# Patient Record
Sex: MALE | Employment: OTHER | URBAN - METROPOLITAN AREA
[De-identification: names, ages, dates, MRNs, and addresses within clinical notes are randomized per-mention and may not be internally consistent; named-entity substitution may affect disease eponyms.]

---

## 2018-04-18 ENCOUNTER — OFFICE VISIT (OUTPATIENT)
Dept: INTERNAL MEDICINE CLINIC | Age: 33
End: 2018-04-18

## 2018-04-18 VITALS
HEIGHT: 68 IN | HEART RATE: 80 BPM | TEMPERATURE: 98 F | BODY MASS INDEX: 26.67 KG/M2 | DIASTOLIC BLOOD PRESSURE: 85 MMHG | RESPIRATION RATE: 18 BRPM | WEIGHT: 176 LBS | SYSTOLIC BLOOD PRESSURE: 131 MMHG | OXYGEN SATURATION: 96 %

## 2018-04-18 DIAGNOSIS — Z76.89 ENCOUNTER TO ESTABLISH CARE: ICD-10-CM

## 2018-04-18 DIAGNOSIS — H54.7 VISION PROBLEM: ICD-10-CM

## 2018-04-18 DIAGNOSIS — Z13.220 SCREENING FOR LIPID DISORDERS: ICD-10-CM

## 2018-04-18 DIAGNOSIS — R53.83 FATIGUE, UNSPECIFIED TYPE: Primary | ICD-10-CM

## 2018-04-18 NOTE — PATIENT INSTRUCTIONS
1.  Vision screening for distance vision here was normal today. Please see ophthalmology to evaluate symptoms further, as reviewed. Please follow the following instructions to process/authorize your referral, if needed:    Referrals processing  Please verify with your insurance IF you need referral authorization submitted. For insurance plans which require this, please follow the following steps. FAILURE TO DO SO MAY RESULT IN INABILITY TO SEE THE SPECIALIST YOU HAVE BEEN REFERRED TO (once you are scheduled to see them). 1. Call and schedule appointment with specialist  2. Call our clinic and leave message with provider name, and date of appointment  3. We will then submit the referral to your insurance. This process takes 2-5 business days. If you have questions about scheduling or authorizing referral, you can review with our referral coordinators Mercedez Parker or Kamila Corona) at the . You can review with them today if available/if you have time, or you can call to review with them once you have made your referral/appointment. If you are not sure if you need referral authorizations, please review with the referral coordinators, either prior to or after you have made the appointment, as reviewed. 2.  Please return here fasting for labs as reviewed.

## 2018-04-18 NOTE — PROGRESS NOTES
Rm 18    Chief Complaint   Patient presents with   [facility].       1. Have you been to the ER, urgent care clinic since your last visit? Hospitalized since your last visit? No    2. Have you seen or consulted any other health care providers outside of the [address] [place] [address] since your last visit? Include any pap smears or colon screening.  Yes Where: [facility], Feb 21, 2018, vaccines Reason for visit: vaccines, labs, health dept eval    Health Maintenance Due   Topic Date Due    DTaP/Tdap/Td series (1 - Tdap) 07/27/2008    Influenza Age 5 to Adult  08/01/2017     PHQ over the last two weeks 4/18/2018   Little interest or pleasure in doing things Not at all   Feeling down, depressed or hopeless Not at all   Total Score PHQ 2 0     Learning Assessment 4/18/2018   PRIMARY LEARNER Patient   HIGHEST LEVEL OF EDUCATION - PRIMARY LEARNER  4 YEARS OF COLLEGE   BARRIERS PRIMARY LEARNER NONE   CO-LEARNER CAREGIVER No   PRIMARY LANGUAGE OTHER (COMMENT)   LEARNER PREFERENCE PRIMARY READING     DEMONSTRATION     LISTENING   ANSWERED BY patient   RELATIONSHIP SELF

## 2018-04-18 NOTE — MR AVS SNAPSHOT
[address]  [address] [facility] [address] 
[phone] Patient: Carmen Egan MRN: [id number] :1985 Visit Information Date & Time Provider Department Dept. Phone Encounter #  
 2018  9:30 AM Maria D Blakely, [address] [place] [address] and Internal Medicine [phone] [id number] Follow-up Instructions Return in about 3 weeks (around 2018) for lab review, referral follow-up; fasting labs here in 1-2 weeks. Upcoming Health Maintenance Date Due DTaP/Tdap/Td series (1 - Tdap) 2008 Influenza Age 5 to Adult 2017 Allergies as of 2018  Review Complete On: 2018 By: Maria D Blakely MD  
 Not on File Current Immunizations  Never Reviewed No immunizations on file. Not reviewed this visit You Were Diagnosed With   
  
 Codes Comments Fatigue, unspecified type    -  Primary ICD-10-CM: R53.83 ICD-9-CM: 780.79 Vision problem     ICD-10-CM: H54.7 ICD-9-CM: V41.0 Screening for lipid disorders     ICD-10-CM: Z13.220 ICD-9-CM: V77.91 Encounter to establish care     ICD-10-CM: Z76.89 
ICD-9-CM: V65.8 Vitals BP Pulse Temp Resp Height(growth percentile) Weight(growth percentile) 131/85 (BP 1 Location: Left arm, BP Patient Position: Sitting) 80 98 °F (36.7 °C) (Oral) 18 5' 8\" (1.727 m) 176 lb (79.8 kg) SpO2 BMI Smoking Status 96% 26.76 kg/m2 Never Smoker BMI and BSA Data Body Mass Index Body Surface Area  
 26.76 kg/m 2 1.96 m 2 Preferred Pharmacy Pharmacy Name Phone [address] [facility], [address] [place]  [phone] Your Updated Medication List  
  
Notice  As of 2018 11:10 AM  
 You have not been prescribed any medications. We Performed the Following AMB POC VISUAL ACUITY SCREEN [55188 CPT(R)] REFERRAL TO PEDIATRIC OPHTHALMOLOGY [UFU585 Custom] Comments:  
 Please evaluate patient for eye fatigue, \"heaviness\". Right 20/25; left 20/20; both 20/20 with distance screening in clinic. Follow-up Instructions Return in about 3 weeks (around 5/9/2018) for lab review, referral follow-up; fasting labs here in 1-2 weeks. To-Do List   
 04/19/2018 Lab:  HEMOGLOBIN A1C WITH EAG   
  
 04/19/2018 Lab:  HEPATIC FUNCTION PANEL   
  
 04/19/2018 Lab:  LIPID PANEL   
  
 04/19/2018 Lab:  T4, FREE   
  
 04/19/2018 Lab:  TSH 3RD GENERATION   
  
 04/19/2018 Lab:  VITAMIN B12 & FOLATE   
  
 04/19/2018 Lab:  VITAMIN D, 25 HYDROXY Referral Information Referral ID Referred By Referred To  
  
 [id number] Dayana Sandoval MD   
   [address] [facility], [address] [place] [address] Phone: [phone] Fax: [phone] Visits Status Start Date End Date 1 New Request 4/18/18 4/18/19 If your referral has a status of pending review or denied, additional information will be sent to support the outcome of this decision. Patient Instructions 1. Vision screening for distance vision here was normal today. Please see ophthalmology to evaluate symptoms further, as reviewed. Please follow the following instructions to process/authorize your referral, if needed: 
 
Referrals processing Please verify with your insurance IF you need referral authorization submitted. For insurance plans which require this, please follow the following steps. FAILURE TO DO SO MAY RESULT IN INABILITY TO SEE THE SPECIALIST YOU HAVE BEEN REFERRED TO (once you are scheduled to see them). 1. Call and schedule appointment with specialist 
2. Call our clinic and leave message with provider name, and date of appointment 3. We will then submit the referral to your insurance. This process takes 2-5 business days. If you have questions about scheduling or authorizing referral, you can review with our referral coordinators Chacha rBavo or Arti Acuna) at the . You can review with them today if available/if you have time, or you can call to review with them once you have made your referral/appointment. If you are not sure if you need referral authorizations, please review with the referral coordinators, either prior to or after you have made the appointment, as reviewed. 2.  Please return here fasting for labs as reviewed. Introducing hospitals & HEALTH SERVICES! Rain Sarmiento introduces [employer] patient portal. Now you can access parts of your medical record, email your doctor's office, and request medication refills online. 1. In your internet browser, go to [email or web address] 2. Click on the First Time User? Click Here link in the Sign In box. You will see the New Member Sign Up page. 3. Enter your [employer] Access Code exactly as it appears below. You will not need to use this code after youve completed the sign-up process. If you do not sign up before the expiration date, you must request a new code. · [employer] Access Code: [email or web address] Expires: 7/17/2018 11:09 AM 
 
4. Enter the last four digits of your Social Security Number (xxxx) and Date of Birth (mm/dd/yyyy) as indicated and click Submit. You will be taken to the next sign-up page. 5. Create a [employer] ID. This will be your [employer] login ID and cannot be changed, so think of one that is secure and easy to remember. 6. Create a [employer] password. You can change your password at any time. 7. Enter your Password Reset Question and Answer. This can be used at a later time if you forget your password. 8. Enter your e-mail address. You will receive e-mail notification when new information is available in [address] 9. Click Sign Up. You can now view and download portions of your medical record. 10. Click the Download Summary menu link to download a portable copy of your medical information. If you have questions, please visit the Frequently Asked Questions section of the [employer] website. Remember, [employer] is NOT to be used for urgent needs. For medical emergencies, dial 911. Now available from your iPhone and Android! Please provide this summary of care documentation to your next provider. Your primary care clinician is listed as [address] [facility]. If you have any questions after today's visit, please call [phone].

## 2018-04-18 NOTE — PROGRESS NOTES
History of Present Illness:   Cathie Hurst is a 29 y.o. male here for evaluation:    Chief Complaint   Patient presents with   [address] records reviewed--entered in [facility] as below:  Past Medical History:   Diagnosis Date    Immunity status testing 02/20/2018    Health Dept Labs:  Varicella non-immune. VZV #1 rec'd at health dept 2022/18.  Routine screening for STI (sexually transmitted infection) 02/20/2018    Health Dept Labs:  HIV non-reactive. T pallidum (syphilis) Ab negative. Hep B negative (negative total cAb, sAb, sAg).  Screening for tuberculosis 02/20/2018    Health Dept Labs:  Negative.  Screening, iron deficiency anemia 02/20/2018    Health Dept Labs:  Normal H18. Normal Chem 8 with glc 100. [facility]:  --Nov 11, 2017:  Influenza, IPV, MMR, Tdap. --Feb 22, 2018: MMR #2, Td, VZV. Abstracted after visit. Has immunization visit scheduled with health dept for Aug 22, 2018 for follow-up vacccine(s). Reviewed [facility]. Pt notes:  He notes in afternoon and evening, starting at 4PM, he feels tired. Notes when not doing work. He notes eyes feel heavy and tired. No vision problems or eye strain noted. He notes eye burning and redness. No vision problems with near vision or distance noted. He notes prior to moving here, using computer 8hrs/day. He was in [place] prior. Emigrated Feb 7, 2018. Onset 2yrs ago in [place]--no eval there/not available. He had DMV testing for vision and normal.    He has not seen ophth since here, nor has his family. No allergy problems noted. Symptoms pre-dated worsening pollen counts here, and noted same problems in [place]. Sometimes has general weakness and fatigue. Screenings as above. He has no chronic medical diagnoses noted.         Prior to Admission medications    Not on File        ROS  Complete ROS negative except as indicated in note. Vitals:    04/18/18 1000   BP: 131/85   Pulse: 80   Resp: 18   Temp: 98 °F (36.7 °C)   TempSrc: Oral   SpO2: 96%   Weight: 176 lb (79.8 kg)   Height: 5' 8\" (1.727 m)   PainSc:   0 - No pain      Body mass index is 26.76 kg/(m^2). Physical Exam:     Physical Exam   Constitutional: He appears well-developed and well-nourished. No distress. HENT:   Head: Normocephalic and atraumatic. Right Ear: External ear normal.   Left Ear: External ear normal.   Mouth/Throat: Oropharynx is clear and moist. No oropharyngeal exudate. TM's with no fluid bilat. No injection or erythema noted bilat. Eyes: Conjunctivae are normal. Right eye exhibits no discharge. Left eye exhibits no discharge. No scleral icterus. Undilated fundoscopic exam normal bilaterally. No significant palpebral or bulbar conjunctival erythema bilat. Neck: Normal range of motion. Neck supple. No tracheal deviation present. No thyromegaly present. Cardiovascular: Normal rate, regular rhythm, normal heart sounds and intact distal pulses. Exam reveals no gallop and no friction rub. No murmur heard. Pulmonary/Chest: Effort normal and breath sounds normal. No stridor. No respiratory distress. He has no wheezes. He has no rales. Abdominal: Soft. Bowel sounds are normal. He exhibits no distension. There is no tenderness. There is no rebound and no guarding. Musculoskeletal: He exhibits no edema, tenderness or deformity. Lymphadenopathy:     He has no cervical adenopathy. Neurological: He is alert. He exhibits normal muscle tone. Coordination normal.   Skin: Skin is warm. No rash noted. He is not diaphoretic. No erythema. No pallor. Psychiatric: He has a normal mood and affect. His behavior is normal. Judgment and thought content normal.       Assessment and Plan:       ICD-10-CM ICD-9-CM    1.  Fatigue, unspecified type R53.83 780.79 TSH 3RD GENERATION      T4, FREE      HEMOGLOBIN A1C WITH EAG VITAMIN B12 & FOLATE      VITAMIN D, 25 HYDROXY   2. Vision problem H54.7 V41.0 AMB POC VISUAL ACUITY SCREEN      REFERRAL TO PEDIATRIC OPHTHALMOLOGY   3. Screening for lipid disorders Z13.220 V77.91 HEPATIC FUNCTION PANEL      LIPID PANEL   4. Encounter to establish care Z76.89 V65.8        1. Lab eval here reviewed. Plan fasting later this week since not fasting at visit. He plans to return this week--maybe tomorrow. Lab scheduling reviewed. 2.  Eval with ophth reviewed. Normal exam here today. Poor discrimination of green color--to eval with ophth. Reviewed with pt at visit. 3.  Fasting labs with fatigue eval labs reviewed. Follow-up Disposition:  Return in about 3 weeks (around 5/9/2018) for lab review, referral follow-up; fasting labs here in 1-2 weeks. lab results and schedule of future lab studies reviewed with patient    For additional documentation of information and/or recommendations discussed this visit, please see notes in instructions. Plan and evaluation (above) reviewed with pt at visit  Patient voiced understanding of plan and provided with time to ask/review questions. After Visit Summary (AVS) provided to pt after visit with additional instructions as needed/reviewed.

## 2018-05-09 ENCOUNTER — OFFICE VISIT (OUTPATIENT)
Dept: INTERNAL MEDICINE CLINIC | Age: 33
End: 2018-05-09

## 2018-05-09 VITALS
HEART RATE: 60 BPM | DIASTOLIC BLOOD PRESSURE: 73 MMHG | HEIGHT: 68 IN | SYSTOLIC BLOOD PRESSURE: 112 MMHG | RESPIRATION RATE: 16 BRPM | OXYGEN SATURATION: 97 % | TEMPERATURE: 97.3 F | BODY MASS INDEX: 26.58 KG/M2 | WEIGHT: 175.38 LBS

## 2018-05-09 DIAGNOSIS — E78.00 ELEVATED LDL CHOLESTEROL LEVEL: ICD-10-CM

## 2018-05-09 DIAGNOSIS — E55.9 HYPOVITAMINOSIS D: Primary | ICD-10-CM

## 2018-05-09 RX ORDER — ERGOCALCIFEROL 1.25 MG/1
50000 CAPSULE ORAL
Qty: 12 CAP | Refills: 0 | Status: SHIPPED | OUTPATIENT
Start: 2018-05-09 | End: 2018-07-26

## 2018-05-09 NOTE — PATIENT INSTRUCTIONS
1.  Once the weekly vitamin D supplement (ergocalciferol) is completed, take at least 2,000 units vitamin D once daily as reviewed. To repeat vitamin D level do not need to fast for labs. 2.  Work on diet and exercise and can repeat cholesterol values in 3-6mo. To repeat cholesterol, need to fast for labs. High Cholesterol: Care Instructions  Your Care Instructions    Cholesterol is a type of fat in your blood. It is needed for many body functions, such as making new cells. Cholesterol is made by your body. It also comes from food you eat. High cholesterol means that you have too much of the fat in your blood. This raises your risk of a heart attack and stroke. LDL and HDL are part of your total cholesterol. LDL is the \"bad\" cholesterol. High LDL can raise your risk for heart disease, heart attack, and stroke. HDL is the \"good\" cholesterol. It helps clear bad cholesterol from the body. High HDL is linked with a lower risk of heart disease, heart attack, and stroke. Your cholesterol levels help your doctor find out your risk for having a heart attack or stroke. You and your doctor can talk about whether you need to lower your risk and what treatment is best for you. A heart-healthy lifestyle along with medicines can help lower your cholesterol and your risk. The way you choose to lower your risk will depend on how high your risk is for heart attack and stroke. It will also depend on how you feel about taking medicines. Follow-up care is a key part of your treatment and safety. Be sure to make and go to all appointments, and call your doctor if you are having problems. It's also a good idea to know your test results and keep a list of the medicines you take. How can you care for yourself at home? · Eat a variety of foods every day.  Good choices include fruits, vegetables, whole grains (like oatmeal), dried beans and peas, nuts and seeds, soy products (like tofu), and fat-free or low-fat dairy products. · Replace butter, margarine, and hydrogenated or partially hydrogenated oils with olive and canola oils. (Canola oil margarine without trans fat is fine.)  · Replace red meat with fish, poultry, and soy protein (like tofu). · Limit processed and packaged foods like chips, crackers, and cookies. · Bake, broil, or steam foods. Don't frank them. · Be physically active. Get at least 30 minutes of exercise on most days of the week. Walking is a good choice. You also may want to do other activities, such as running, swimming, cycling, or playing tennis or team sports. · Stay at a healthy weight or lose weight by making the changes in eating and physical activity listed above. Losing just a small amount of weight, even 5 to 10 pounds, can reduce your risk for having a heart attack or stroke. · Do not smoke. When should you call for help? Watch closely for changes in your health, and be sure to contact your doctor if:  ? · You need help making lifestyle changes. ? · You have questions about your medicine. Where can you learn more? Go to http://marshallNeocase Softwarelila.info/. Enter X635 in the search box to learn more about \"High Cholesterol: Care Instructions. \"  Current as of: September 21, 2016  Content Version: 11.4  © 8933-6756 [employer]. Care instructions adapted under license by [employer] (which disclaims liability or warranty for this information). If you have questions about a medical condition or this instruction, always ask your healthcare professional. [place] [address] any warranty or liability for your use of this information. Learning About Vitamin D  Why is it important to get enough vitamin D? Your body needs vitamin D to absorb calcium. Calcium keeps your bones and muscles, including your heart, healthy and strong. If your muscles don't get enough calcium, they can cramp, hurt, or feel weak.  You may have long-term (chronic) muscle aches and pains. If you don't get enough vitamin D throughout life, you have an increased chance of having thin and brittle bones (osteoporosis) in your later years. Children who don't get enough vitamin D may not grow as much as others their age. They also have a chance of getting a rare disease called rickets. It causes weak bones. Vitamin D and calcium are added to many foods. And your body uses sunshine to make its own vitamin D. How much vitamin D do you need? The [employer] recommends that people ages 3 through 79 get 600 IU (international units) every day. Adults 71 and older need 800 IU every day. Blood tests for vitamin D can check your vitamin D level. But there is no standard normal range used by all laboratories. The [employer] of [employer] recommends a blood level of 20 ng/mL of vitamin D for healthy bones. And most people in the [place] and [facility] meet this goal.  How can you get more vitamin D? Foods that contain vitamin D include:  · Calipatria, tuna, and mackerel. These are some of the best foods to eat when you need to get more vitamin D.  · Cheese, egg yolks, and beef liver. These foods have vitamin D in small amounts. · Milk, soy drinks, orange juice, yogurt, margarine, and some kinds of cereal have vitamin D added to them. Some people don't make vitamin D as well as others. They may have to take extra care in getting enough vitamin D. Things that reduce how much vitamin D your body makes include:  · Dark skin, such as many  Americans have. · Age, especially if you are older than 72. · Digestive problems, such as Crohn's or celiac disease. · Liver and kidney disease. Some people who do not get enough vitamin D may need supplements. Are there any risks from taking vitamin D?  · Too much vitamin D:  ¨ Can damage your kidneys. ¨ Can cause nausea and vomiting, constipation, and weakness. ¨ Raises the amount of calcium in your blood.  If this happens, you can get confused or have an irregular heart rhythm. · Vitamin D may interact with other medicines. Tell your doctor about all of the medicines you take, including over-the-counter drugs, herbs, and pills. Tell your doctor about all of your current medical problems. Where can you learn more? Go to http://callie.info/. Enter [id number] in the search box to learn more about \"Learning About Vitamin D.\"  Current as of: May 12, 2017  Content Version: 11.4  © 6603-8218 [employer]. Care instructions adapted under license by [employer] (which disclaims liability or warranty for this information). If you have questions about a medical condition or this instruction, always ask your healthcare professional. [address] any warranty or liability for your use of this information. Learning About High Cholesterol  What is high cholesterol? Cholesterol is a type of fat in your blood. It is needed for many body functions, such as making new cells. Cholesterol is made by your body. It also comes from food you eat. If you have too much cholesterol, it starts to build up in your arteries. This is called hardening of the arteries, or atherosclerosis. High cholesterol raises your risk of a heart attack and stroke. There are different types of cholesterol. LDL is the \"bad\" cholesterol. High LDL can raise your risk for heart disease, heart attack, and stroke. HDL is the \"good\" cholesterol. High HDL is linked with a lower risk for heart disease, heart attack, and stroke. Your cholesterol levels help your doctor find out your risk for having a heart attack or stroke. How can you prevent high cholesterol? A heart-healthy lifestyle can help you prevent high cholesterol. This lifestyle helps lower your risk for a heart attack and stroke. · Eat heart-healthy foods.   ¨ Eat fruits, vegetables, whole grains (like oatmeal), dried beans and peas, nuts and seeds, soy products (like tofu), and fat-free or low-fat dairy products. ¨ Replace butter, margarine, and hydrogenated or partially hydrogenated oils with olive and canola oils. (Canola oil margarine without trans fat is fine.)  ¨ Replace red meat with fish, poultry, and soy protein (like tofu). ¨ Limit processed and packaged foods like chips, crackers, and cookies. · Be active. Exercise can improve your cholesterol level. Get at least 30 minutes of exercise on most days of the week. Walking is a good choice. You also may want to do other activities, such as running, swimming, cycling, or playing tennis or team sports. · Stay at a healthy weight. Lose weight if you need to. · Don't smoke. If you need help quitting, talk to your doctor about stop-smoking programs and medicines. These can increase your chances of quitting for good. How is high cholesterol treated? The goal of treatment is to reduce your chances of having a heart attack or stroke. The goal is not to lower your cholesterol numbers only. · You may make lifestyle changes, such as eating healthy foods, not smoking, losing weight, and being more active. · You may have to take medicine. Follow-up care is a key part of your treatment and safety. Be sure to make and go to all appointments, and call your doctor if you are having problems. It's also a good idea to know your test results and keep a list of the medicines you take. Where can you learn more? Go to http://marshall-lila.info/. Enter U495 in the search box to learn more about \"Learning About High Cholesterol. \"  Current as of: September 21, 2016  Content Version: 11.4  © 1935-6279 [employer]. Care instructions adapted under license by [employer] (which disclaims liability or warranty for this information).  If you have questions about a medical condition or this instruction, always ask your healthcare professional. [address] any warranty or liability for your use of this information.

## 2018-05-09 NOTE — MR AVS SNAPSHOT
[address] [facility] [place] [address] 
[phone] Patient: Guillermina Rubinstein MRN: [id number] :1985 Visit Information Date & Time Provider Department Dept. Phone Encounter #  
 2018 11:30 AM Claudette Pires, [address] [place] [address] and Internal Medicine [phone] [id number] Follow-up Instructions Return in about 6 months (around 2018), or if symptoms worsen or fail to improve, for fasting labs--3-6mo. Upcoming Health Maintenance Date Due Influenza Age 5 to Adult 2018 DTaP/Tdap/Td series (2 - Td) 2028 Allergies as of 2018  Review Complete On: 2018 By: Claudette Pires MD  
 Not on File Current Immunizations  Never Reviewed Name Date IPV 2017 Influenza Vaccine 2017 MMR 2018, 2017 Td 2018 Tdap 2017 Varicella Virus Vaccine 2018 Not reviewed this visit You Were Diagnosed With   
  
 Codes Comments Hypovitaminosis D    -  Primary ICD-10-CM: E55.9 ICD-9-CM: 268.9 Elevated LDL cholesterol level     ICD-10-CM: E78.00 ICD-9-CM: 272.0 Vitals BP Pulse Temp Resp Height(growth percentile) Weight(growth percentile) 112/73 (BP 1 Location: Left arm, BP Patient Position: Sitting) 60 97.3 °F (36.3 °C) (Oral) 16 5' 8\" (1.727 m) 175 lb 6 oz (79.5 kg) SpO2 BMI Smoking Status 97% 26.67 kg/m2 Never Smoker BMI and BSA Data Body Mass Index Body Surface Area  
 26.67 kg/m 2 1.95 m 2 Preferred Pharmacy Pharmacy Name Phone [facility] [address] [facility], [address] [place] [address]  [phone] Your Updated Medication List  
  
   
This list is accurate as of 18 12:55 PM.  Always use your most recent med list.  
  
  
  
  
 ergocalciferol 50,000 unit capsule Commonly known as:  ERGOCALCIFEROL Take 1 Cap by mouth every seven (7) days for 12 doses. Prescriptions Sent to Pharmacy Refills  
 ergocalciferol (ERGOCALCIFEROL) 50,000 unit capsule 0 Sig: Take 1 Cap by mouth every seven (7) days for 12 doses. Class: Normal  
 Pharmacy: [address], [place]  #: [phone] Route: Oral  
  
Follow-up Instructions Return in about 6 months (around 11/9/2018), or if symptoms worsen or fail to improve, for fasting labs--3-6mo. Patient Instructions 1. Once the weekly vitamin D supplement (ergocalciferol) is completed, take at least 2,000 units vitamin D once daily as reviewed. To repeat vitamin D level do not need to fast for labs. 2.  Work on diet and exercise and can repeat cholesterol values in 3-6mo. To repeat cholesterol, need to fast for labs. High Cholesterol: Care Instructions Your Care Instructions Cholesterol is a type of fat in your blood. It is needed for many body functions, such as making new cells. Cholesterol is made by your body. It also comes from food you eat. High cholesterol means that you have too much of the fat in your blood. This raises your risk of a heart attack and stroke. LDL and HDL are part of your total cholesterol. LDL is the \"bad\" cholesterol. High LDL can raise your risk for heart disease, heart attack, and stroke. HDL is the \"good\" cholesterol. It helps clear bad cholesterol from the body. High HDL is linked with a lower risk of heart disease, heart attack, and stroke. Your cholesterol levels help your doctor find out your risk for having a heart attack or stroke. You and your doctor can talk about whether you need to lower your risk and what treatment is best for you. A heart-healthy lifestyle along with medicines can help lower your cholesterol and your risk.  The way you choose to lower your risk will depend on how high your risk is for heart attack and stroke. It will also depend on how you feel about taking medicines. Follow-up care is a key part of your treatment and safety. Be sure to make and go to all appointments, and call your doctor if you are having problems. It's also a good idea to know your test results and keep a list of the medicines you take. How can you care for yourself at home? · Eat a variety of foods every day. Good choices include fruits, vegetables, whole grains (like oatmeal), dried beans and peas, nuts and seeds, soy products (like tofu), and fat-free or low-fat dairy products. · Replace butter, margarine, and hydrogenated or partially hydrogenated oils with olive and canola oils. (Canola oil margarine without trans fat is fine.) · Replace red meat with fish, poultry, and soy protein (like tofu). · Limit processed and packaged foods like chips, crackers, and cookies. · Bake, broil, or steam foods. Don't frank them. · Be physically active. Get at least 30 minutes of exercise on most days of the week. Walking is a good choice. You also may want to do other activities, such as running, swimming, cycling, or playing tennis or team sports. · Stay at a healthy weight or lose weight by making the changes in eating and physical activity listed above. Losing just a small amount of weight, even 5 to 10 pounds, can reduce your risk for having a heart attack or stroke. · Do not smoke. When should you call for help? Watch closely for changes in your health, and be sure to contact your doctor if: 
? · You need help making lifestyle changes. ? · You have questions about your medicine. Where can you learn more? Go to http://marshall-lila.info/. Enter F299 in the search box to learn more about \"High Cholesterol: Care Instructions. \" Current as of: September 21, 2016 Content Version: 11.4 © 1796-4227 Healthwise, Incorporated.  Care instructions adapted under license by [facility] [address] (which disclaims liability or warranty for this information). If you have questions about a medical condition or this instruction, always ask your healthcare professional. [address] any warranty or liability for your use of this information. Learning About Vitamin D Why is it important to get enough vitamin D? Your body needs vitamin D to absorb calcium. Calcium keeps your bones and muscles, including your heart, healthy and strong. If your muscles don't get enough calcium, they can cramp, hurt, or feel weak. You may have long-term (chronic) muscle aches and pains. If you don't get enough vitamin D throughout life, you have an increased chance of having thin and brittle bones (osteoporosis) in your later years. Children who don't get enough vitamin D may not grow as much as others their age. They also have a chance of getting a rare disease called rickets. It causes weak bones. Vitamin D and calcium are added to many foods. And your body uses sunshine to make its own vitamin D. How much vitamin D do you need? The [place] of Medicine recommends that people ages 3 through 79 get 600 IU (international units) every day. Adults 71 and older need 800 IU every day. Blood tests for vitamin D can check your vitamin D level. But there is no standard normal range used by all laboratories. The [place] of Medicine recommends a blood level of 20 ng/mL of vitamin D for healthy bones. And most people in the [place] and [facility] ([facility]) meet this goal. 
How can you get more vitamin D? Foods that contain vitamin D include: 
· [place], tuna, and mackerel. These are some of the best foods to eat when you need to get more vitamin D. 
· Cheese, egg yolks, and beef liver. These foods have vitamin D in small amounts. · Milk, soy drinks, orange juice, yogurt, margarine, and some kinds of cereal have vitamin D added to them. Some people don't make vitamin D as well as others. They may have to take extra care in getting enough vitamin D. Things that reduce how much vitamin D your body makes include: · Dark skin, such as many  Americans have. · Age, especially if you are older than 72. · Digestive problems, such as Crohn's or celiac disease. · Liver and kidney disease. Some people who do not get enough vitamin D may need supplements. Are there any risks from taking vitamin D? 
· Too much vitamin D: 
¨ Can damage your kidneys. ¨ Can cause nausea and vomiting, constipation, and weakness. ¨ Raises the amount of calcium in your blood. If this happens, you can get confused or have an irregular heart rhythm. · Vitamin D may interact with other medicines. Tell your doctor about all of the medicines you take, including over-the-counter drugs, herbs, and pills. Tell your doctor about all of your current medical problems. Where can you learn more? Go to http://marshall-lila.info/. Enter [id number] in the search box to learn more about \"Learning About Vitamin D.\" 
Current as of: May 12, 2017 Content Version: 11.4 © 1801-0245 [employer]. Care instructions adapted under license by [employer] (which disclaims liability or warranty for this information). If you have questions about a medical condition or this instruction, always ask your healthcare professional. [address] any warranty or liability for your use of this information. Learning About High Cholesterol What is high cholesterol? Cholesterol is a type of fat in your blood. It is needed for many body functions, such as making new cells. Cholesterol is made by your body. It also comes from food you eat. If you have too much cholesterol, it starts to build up in your arteries. This is called hardening of the arteries, or atherosclerosis. High cholesterol raises your risk of a heart attack and stroke. There are different types of cholesterol. LDL is the \"bad\" cholesterol. High LDL can raise your risk for heart disease, heart attack, and stroke. HDL is the \"good\" cholesterol. High HDL is linked with a lower risk for heart disease, heart attack, and stroke. Your cholesterol levels help your doctor find out your risk for having a heart attack or stroke. How can you prevent high cholesterol? A heart-healthy lifestyle can help you prevent high cholesterol. This lifestyle helps lower your risk for a heart attack and stroke. · Eat heart-healthy foods. ¨ Eat fruits, vegetables, whole grains (like oatmeal), dried beans and peas, nuts and seeds, soy products (like tofu), and fat-free or low-fat dairy products. ¨ Replace butter, margarine, and hydrogenated or partially hydrogenated oils with olive and canola oils. (Canola oil margarine without trans fat is fine.) ¨ Replace red meat with fish, poultry, and soy protein (like tofu). ¨ Limit processed and packaged foods like chips, crackers, and cookies. · Be active. Exercise can improve your cholesterol level. Get at least 30 minutes of exercise on most days of the week. Walking is a good choice. You also may want to do other activities, such as running, swimming, cycling, or playing tennis or team sports. · Stay at a healthy weight. Lose weight if you need to. · Don't smoke. If you need help quitting, talk to your doctor about stop-smoking programs and medicines. These can increase your chances of quitting for good. How is high cholesterol treated? The goal of treatment is to reduce your chances of having a heart attack or stroke. The goal is not to lower your cholesterol numbers only. · You may make lifestyle changes, such as eating healthy foods, not smoking, losing weight, and being more active. · You may have to take medicine. Follow-up care is a key part of your treatment and safety.  Be sure to make and go to all appointments, and call your doctor if you are having problems. It's also a good idea to know your test results and keep a list of the medicines you take. Where can you learn more? Go to http://marshall-lila.info/. Enter K428 in the search box to learn more about \"Learning About High Cholesterol. \" Current as of: September 21, 2016 Content Version: 11.4 © 4493-2363 [employer]. Care instructions adapted under license by [employer] (which disclaims liability or warranty for this information). If you have questions about a medical condition or this instruction, always ask your healthcare professional. [address] any warranty or liability for your use of this information. Introducing [facility]! [facility] introduces Relaborate patient portal. Now you can access parts of your medical record, email your doctor's office, and request medication refills online. 1. In your internet browser, go to [email or web address] 2. Click on the First Time User? Click Here link in the Sign In box. You will see the New Member Sign Up page. 3. Enter your Relaborate Access Code exactly as it appears below. You will not need to use this code after youve completed the sign-up process. If you do not sign up before the expiration date, you must request a new code. · Relaborate Access Code: [email or web address] Expires: 7/17/2018 11:09 AM 
 
4. Enter the last four digits of your Social Security Number (xxxx) and Date of Birth (mm/dd/yyyy) as indicated and click Submit. You will be taken to the next sign-up page. 5. Create a Electronic Compute Systemst ID. This will be your Relaborate login ID and cannot be changed, so think of one that is secure and easy to remember. 6. Create a Relaborate password. You can change your password at any time. 7. Enter your Password Reset Question and Answer.  This can be used at a later time if you forget your password. 8. Enter your e-mail address. You will receive e-mail notification when new information is available in [address] 9. Click Sign Up. You can now view and download portions of your medical record. 10. Click the Download Summary menu link to download a portable copy of your medical information. If you have questions, please visit the Frequently Asked Questions section of the [employer] website. Remember, [employer] is NOT to be used for urgent needs. For medical emergencies, dial 911. Now available from your iPhone and Android! Please provide this summary of care documentation to your next provider. Your primary care clinician is listed as [address] [facility]. If you have any questions after today's visit, please call [phone].

## 2018-05-09 NOTE — PROGRESS NOTES
History of Present Illness:   Guillermina Rubinstein is a 29 y.o. male here for evaluation:    Chief Complaint   Patient presents with    Labs     review    Referral Follow Up     Here for lab follow-up and ophth follow-up. Referred to ophth for possible eye problem/eye strain. He notes seeing ophth tomorrow. Prior to Admission medications    Not on File        ROS    Vitals:    05/09/18 1200   BP: 112/73   Pulse: 60   Resp: 16   Temp: 97.3 °F (36.3 °C)   TempSrc: Oral   SpO2: 97%   Weight: 175 lb 6 oz (79.5 kg)   Height: 5' 8\" (1.727 m)   PainSc:   0 - No pain      Body mass index is 26.67 kg/(m^2). Physical Exam:     Physical Exam   Constitutional: He appears well-developed and well-nourished. No distress. HENT:   Head: Normocephalic and atraumatic. Eyes: Conjunctivae are normal. Right eye exhibits no discharge. Left eye exhibits no discharge. No scleral icterus. Cardiovascular: Normal rate. Pulmonary/Chest: Effort normal. No stridor. Abdominal: He exhibits no distension. Neurological: He is alert. He exhibits normal muscle tone. Coordination normal.   Skin: Skin is warm. No rash noted. He is not diaphoretic. No pallor. Psychiatric: He has a normal mood and affect. His behavior is normal. Judgment and thought content normal.       Assessment and Plan:       ICD-10-CM ICD-9-CM    1. Hypovitaminosis D E55.9 268.9 ergocalciferol (ERGOCALCIFEROL) 50,000 unit capsule   2. Elevated LDL cholesterol level E78.00 272.0        Reviewed labs as above. Follow-up Disposition:  Return in about 6 months (around 11/9/2018), or if symptoms worsen or fail to improve, for fasting labs--3-6mo. lab results and schedule of future lab studies reviewed with patient  reviewed diet, exercise and weight control  reviewed medications and side effects in detail    For additional documentation of information and/or recommendations discussed this visit, please see notes in instructions.       Plan and evaluation (above) reviewed with pt at visit  Patient voiced understanding of plan and provided with time to ask/review questions. After Visit Summary (AVS) provided to pt after visit with additional instructions as needed/reviewed.

## 2018-05-09 NOTE — PROGRESS NOTES
RM 18    Chief Complaint   Patient presents with    Labs     review    Referral Follow Up       1. Have you been to the ER, urgent care clinic since your last visit? Hospitalized since your last visit? No    2. Have you seen or consulted any other health care providers outside of the [address] [place] [address] since your last visit? Include any pap smears or colon screening. No    There are no preventive care reminders to display for this patient.     PHQ over the last two weeks 5/9/2018   Little interest or pleasure in doing things Not at all   Feeling down, depressed or hopeless Not at all   Total Score PHQ 2 0

## 2018-11-02 ENCOUNTER — CLINICAL SUPPORT (OUTPATIENT)
Dept: INTERNAL MEDICINE CLINIC | Age: 33
End: 2018-11-02

## 2018-11-02 DIAGNOSIS — Z23 ENCOUNTER FOR IMMUNIZATION: Primary | ICD-10-CM

## 2018-11-02 NOTE — PROGRESS NOTES
Chief Complaint   Patient presents with    Immunization/Injection         Vishal Lang who presents for routine immunizations. Pt received in left deltoid. Maureenl Dies denies any symptoms, reactions or allergies that would exclude them from being immunized today. Risks and adverse reactions were discussed and the VIS was given to them. All questions were addressed. Vishal Dies was observed for 10 min post injection. There were no reactions observed. Verbal Order received per Dr. Joel Alcaraz to administer influenza vaccine.

## 2018-12-05 ENCOUNTER — OFFICE VISIT (OUTPATIENT)
Dept: INTERNAL MEDICINE CLINIC | Age: 33
End: 2018-12-05

## 2018-12-05 VITALS
HEART RATE: 75 BPM | RESPIRATION RATE: 16 BRPM | HEIGHT: 68 IN | SYSTOLIC BLOOD PRESSURE: 121 MMHG | DIASTOLIC BLOOD PRESSURE: 80 MMHG | OXYGEN SATURATION: 96 % | TEMPERATURE: 98.5 F | BODY MASS INDEX: 27.49 KG/M2 | WEIGHT: 181.38 LBS

## 2018-12-05 DIAGNOSIS — R10.30 LOWER ABDOMINAL PAIN: Primary | ICD-10-CM

## 2018-12-05 DIAGNOSIS — R31.29 OTHER MICROSCOPIC HEMATURIA: ICD-10-CM

## 2018-12-05 DIAGNOSIS — N50.812 PAIN IN LEFT TESTICLE: ICD-10-CM

## 2018-12-05 LAB
BILIRUB UR QL STRIP: NEGATIVE
GLUCOSE UR-MCNC: NEGATIVE MG/DL
KETONES P FAST UR STRIP-MCNC: NEGATIVE MG/DL
PH UR STRIP: 5.5 [PH] (ref 4.6–8)
PROT UR QL STRIP: NEGATIVE
SP GR UR STRIP: 1.02 (ref 1–1.03)
UA UROBILINOGEN AMB POC: NORMAL (ref 0.2–1)
URINALYSIS CLARITY POC: CLEAR
URINALYSIS COLOR POC: YELLOW
URINE BLOOD POC: NORMAL
URINE LEUKOCYTES POC: NEGATIVE
URINE NITRITES POC: NEGATIVE

## 2018-12-05 RX ORDER — POLYETHYLENE GLYCOL 3350 17 G/17G
17 POWDER, FOR SOLUTION ORAL DAILY
Qty: 595 G | Refills: 1 | Status: SHIPPED | OUTPATIENT
Start: 2018-12-05 | End: 2019-09-19

## 2018-12-05 NOTE — PROGRESS NOTES
RM 17 Patient has concerns with weight gain, reports exercising more, diet same from previous visit. Chief Complaint Patient presents with  Abdominal Pain  
  occuring for the past month. C/o pain to lower abdomen prior to having to have a bowel movement. 1. Have you been to the ER, urgent care clinic since your last visit? Hospitalized since your last visit? No 
 
2. Have you seen or consulted any other health care providers outside of the [address] [place] [address] since your last visit? Include any pap smears or colon screening. No 
 
There are no preventive care reminders to display for this patient. PHQ over the last two weeks 12/5/2018 Little interest or pleasure in doing things Not at all Feeling down, depressed, irritable, or hopeless Not at all Total Score PHQ 2 0 Abuse Screening Questionnaire 12/5/2018 Do you ever feel afraid of your partner? Tito Angeles Are you in a relationship with someone who physically or mentally threatens you? Tito Angeles Is it safe for you to go home?  Ysabel Cross

## 2018-12-05 NOTE — PATIENT INSTRUCTIONS
Results for orders placed or performed in visit on 12/05/18 AMB POC URINALYSIS DIP STICK AUTO W/O MICRO Result Value Ref Range Color (UA POC) Yellow Clarity (UA POC) Clear Glucose (UA POC) Negative Negative Bilirubin (UA POC) Negative Negative Ketones (UA POC) Negative Negative Specific gravity (UA POC) 1.020 1.001 - 1.035 Blood (UA POC) 1+ Negative pH (UA POC) 5.5 4.6 - 8.0 Protein (UA POC) Negative Negative Urobilinogen (UA POC) 0.2 mg/dL 0.2 - 1 Nitrites (UA POC) Negative Negative Leukocyte esterase (UA POC) Negative Negative 1. Urine testing is abnormal.  If pain worsens, you see blood in your urine, or you cannot urinate, please call or go to emergency room to evaluate. 2.  You can call [phone] for Central Scheduling. They can help you schedule the ultrasound (2). You will typically be called in 2-3 business days to schedule. If you have problems scheduling, please call here to speak with someone in our clinic. 3.  Start the Miralax daily. Goal is to have 1-2 pasty to soft stools one-two times daily. If the daily dosing is not helping with this, please increase the dose. You can either increase to 17gm two times daily or 34gm once daily.

## 2018-12-05 NOTE — PROGRESS NOTES
History of Present Illness:  
Vishal Lang is a 34 y.o. male here for evaluation: Chief Complaint Patient presents with  Abdominal Pain  
  occuring for the past month. C/o pain to lower abdomen prior to having to have a bowel movement. Here to evaluate abdominal pain. Wt Readings from Last 3 Encounters:  
12/05/18 181 lb 6 oz (82.3 kg) 05/09/18 175 lb 6 oz (79.5 kg) 04/18/18 176 lb (79.8 kg) He notes pain in lower-mid-anterior pelvis prior to BM's. He notes some light pain in left testicle at times. This has occurred in association with left testicle pain. Pain has been there for past 1mo. BM changes/pattern does not seem to affect pain. He has BM once daily--\"normal\"--not reported as hard. He notes when pain was more severe, he had more frequent pain then. He notes pain just prior to going to have BM. He has pain with cough in suprapubic area, and pain with pushing over suprapubic area also. Prior to Admission medications Not on File ROS Vitals:  
 12/05/18 0840 BP: 121/80 Pulse: 75 Resp: 16 Temp: 98.5 °F (36.9 °C) TempSrc: Oral  
SpO2: 96% Weight: 181 lb 6 oz (82.3 kg) Height: 5' 8\" (1.727 m) PainSc:   0 - No pain Body mass index is 27.58 kg/m². Physical Exam:  
 
Physical Exam  
Constitutional: He appears well-developed and well-nourished. No distress. HENT:  
Head: Normocephalic and atraumatic. Eyes: Conjunctivae are normal. Right eye exhibits no discharge. Left eye exhibits no discharge. No scleral icterus. Neck: Normal range of motion. Neck supple. Cardiovascular: Normal rate, regular rhythm, normal heart sounds and intact distal pulses. Exam reveals no gallop and no friction rub. No murmur heard. Pulmonary/Chest: Effort normal and breath sounds normal. No respiratory distress. He has no wheezes. He has no rales. Abdominal: Soft.  Bowel sounds are normal. He exhibits no distension and no mass. There is tenderness (notes suprapubic on exam as location pain. ). There is no rebound and no guarding. Musculoskeletal: He exhibits no edema, tenderness or deformity. Neurological: He is alert. He exhibits normal muscle tone. Coordination normal.  
Skin: Skin is warm. No rash noted. He is not diaphoretic. No erythema. No pallor. Psychiatric: He has a normal mood and affect. His behavior is normal. Judgment and thought content normal.  
 exam:   
Scrotal/ exam with chaperone: No testicle pain or asymmetry bilat. No epididymal pain noted bilat. No hernia bilat, but pain with hernia exam/cough left inguinal ring. Reviewed findings and eval with pt at visit. Results for orders placed or performed in visit on 12/05/18 AMB POC URINALYSIS DIP STICK AUTO W/O MICRO Result Value Ref Range Color (UA POC) Yellow Clarity (UA POC) Clear Glucose (UA POC) Negative Negative Bilirubin (UA POC) Negative Negative Ketones (UA POC) Negative Negative Specific gravity (UA POC) 1.020 1.001 - 1.035 Blood (UA POC) 1+ Negative pH (UA POC) 5.5 4.6 - 8.0 Protein (UA POC) Negative Negative Urobilinogen (UA POC) 0.2 mg/dL 0.2 - 1 Nitrites (UA POC) Negative Negative Leukocyte esterase (UA POC) Negative Negative Assessment and Plan: ICD-10-CM ICD-9-CM 1. Lower abdominal pain--suprapubic with BM, cough R10.30 789.09 AMB POC URINALYSIS DIP STICK AUTO W/O MICRO polyethylene glycol (MIRALAX) 17 gram/dose powder US RETROPERITONEUM COMP 2. Pain in left testicle N50.812 608.9 AMB POC URINALYSIS DIP STICK AUTO W/O MICRO  
   US SCROTUM/TESTICLES  
   US RETROPERITONEUM COMP 3. Other microscopic hematuria R31.29 599.72 US RETROPERITONEUM COMP 1. Imaging and medication reviewed. 2,3:  Imaging reviewed. Follow-up Disposition: 
Return in about 2 weeks (around 12/19/2018) for abdominal pain follow-up--1-2 weeks. lab results and schedule of future lab studies reviewed with patient 
reviewed medications and side effects in detail 
radiology results and schedule of future radiology studies reviewed with patient For additional documentation of information and/or recommendations discussed this visit, please see notes in instructions. Plan and evaluation (above) reviewed with pt at visit Patient voiced understanding of plan and provided with time to ask/review questions. After Visit Summary (AVS) provided to pt after visit with additional instructions as needed/reviewed.

## 2018-12-07 ENCOUNTER — HOSPITAL ENCOUNTER (OUTPATIENT)
Dept: ULTRASOUND IMAGING | Age: 33
Discharge: HOME | End: 2018-12-07
Attending: INTERNAL MEDICINE
Payer: MEDICAID

## 2018-12-07 DIAGNOSIS — N50.812 PAIN IN LEFT TESTICLE: ICD-10-CM

## 2018-12-07 DIAGNOSIS — R31.29 OTHER MICROSCOPIC HEMATURIA: ICD-10-CM

## 2018-12-07 DIAGNOSIS — R10.30 LOWER ABDOMINAL PAIN: ICD-10-CM

## 2018-12-07 PROCEDURE — 76770 US EXAM ABDO BACK WALL COMP: CPT

## 2018-12-07 PROCEDURE — 76870 US EXAM SCROTUM: CPT

## 2019-09-06 ENCOUNTER — OFFICE VISIT (OUTPATIENT)
Dept: INTERNAL MEDICINE CLINIC | Age: 34
End: 2019-09-06

## 2019-09-06 VITALS
WEIGHT: 178.4 LBS | HEART RATE: 78 BPM | SYSTOLIC BLOOD PRESSURE: 123 MMHG | BODY MASS INDEX: 27.04 KG/M2 | HEIGHT: 68 IN | RESPIRATION RATE: 16 BRPM | OXYGEN SATURATION: 99 % | TEMPERATURE: 97.9 F | DIASTOLIC BLOOD PRESSURE: 75 MMHG

## 2019-09-06 DIAGNOSIS — Z23 ENCOUNTER FOR IMMUNIZATION: ICD-10-CM

## 2019-09-06 DIAGNOSIS — K20.90 ESOPHAGITIS: ICD-10-CM

## 2019-09-06 DIAGNOSIS — J30.89 ALLERGIC RHINITIS DUE TO OTHER ALLERGIC TRIGGER, UNSPECIFIED SEASONALITY: ICD-10-CM

## 2019-09-06 DIAGNOSIS — R09.89 GLOBUS SENSATION: Primary | ICD-10-CM

## 2019-09-06 RX ORDER — SUCRALFATE 1 G/1
1 TABLET ORAL 4 TIMES DAILY
Qty: 30 TAB | Refills: 1 | Status: SHIPPED | OUTPATIENT
Start: 2019-09-06 | End: 2019-09-19

## 2019-09-06 RX ORDER — ESOMEPRAZOLE MAGNESIUM 40 MG/1
40 CAPSULE, DELAYED RELEASE ORAL DAILY
Qty: 30 CAP | Refills: 1 | Status: SHIPPED | OUTPATIENT
Start: 2019-09-06 | End: 2021-04-12

## 2019-09-06 RX ORDER — FLUTICASONE PROPIONATE 50 MCG
2 SPRAY, SUSPENSION (ML) NASAL DAILY
Qty: 1 BOTTLE | Refills: 2 | Status: SHIPPED | OUTPATIENT
Start: 2019-09-06 | End: 2021-04-12

## 2019-09-06 NOTE — PROGRESS NOTES
History of Present Illness:   Josefa Henson is a 30 y.o. male here for evaluation:    Chief Complaint   Patient presents with    GERD     pt states he has discomfort in his throat , x 1 week     Notes:  Pt states he has some discomfort in his throat\" as if something is stuck\" this has been going on  For 1 week. Per pt discomfort gets worse when eating . He feels like there is something stuck in his chest--in his esophagus. He notes symptoms in relation to eating in throat. In upper chest/mid-chest, he notes throughout day, but gets worse with eating. He notes with food, has worsening throat symptoms, which feels tight/or like a pressure. He notes feels like food stuck, but never has to vomit food out. No abd pain or epigastric symptoms. No history of GERD or other symptoms noted. He has not taken OTC meds for symptoms. He notes no change with food consistency--soft, meat, liquid. Notes no problems with liquids. Drinking fluids does not cause problems. No change in symptoms with warm or cool/cold foods. Prior to Admission medications    Medication Sig Start Date End Date Taking? Authorizing Provider   polyethylene glycol (MIRALAX) 17 gram/dose powder Take 17 g by mouth daily. Mix in  6-8 ounces juice and take 1-2 times daily as reviewed. 12/5/18   Sendy Greene MD        ROS    Vitals:    09/06/19 1207   BP: 123/75   Pulse: 78   Resp: 16   Temp: 97.9 °F (36.6 °C)   TempSrc: Oral   SpO2: 99%   Weight: 178 lb 6.4 oz (80.9 kg)   Height: 5' 8\" (1.727 m)   PainSc:   0 - No pain      Body mass index is 27.13 kg/m². Physical Exam:     Physical Exam   Constitutional: He appears well-developed and well-nourished. No distress. HENT:   Head: Normocephalic and atraumatic. Right Ear: External ear normal.   Left Ear: External ear normal.   Mouth/Throat: Oropharynx is clear and moist. No oropharyngeal exudate.    Moderate right > left boggy nasopharyngeal edema present bilaterally with slight amount clear discharge bilat. Eyes: Conjunctivae are normal. Right eye exhibits no discharge. Left eye exhibits no discharge. No scleral icterus. Neck: Normal range of motion. Neck supple. No tracheal deviation present. No thyromegaly present. Cardiovascular: Normal rate, regular rhythm, normal heart sounds and intact distal pulses. Exam reveals no gallop and no friction rub. No murmur heard. Pulmonary/Chest: Effort normal and breath sounds normal. No stridor. No respiratory distress. He has no wheezes. He has no rales. Abdominal: Soft. Bowel sounds are normal. He exhibits no distension. There is no tenderness. No epigastric tenderness. Musculoskeletal: He exhibits no edema or tenderness. Lymphadenopathy:     He has no cervical adenopathy. Neurological: He is alert. He exhibits normal muscle tone. Coordination normal.   Skin: Skin is warm. No rash noted. He is not diaphoretic. No erythema. No pallor. Psychiatric: He has a normal mood and affect. His behavior is normal. Judgment and thought content normal.           Assessment and Plan:       ICD-10-CM ICD-9-CM    1. Globus sensation F45.8 306.4 fluticasone propionate (FLONASE) 50 mcg/actuation nasal spray      sucralfate (CARAFATE) 1 gram tablet      esomeprazole (NEXIUM) 40 mg capsule   2. Encounter for immunization Z23 V03.89 INFLUENZA VIRUS VAC QUAD,SPLIT,PRESV FREE SYRINGE IM   3. Esophagitis K20.9 530.10 sucralfate (CARAFATE) 1 gram tablet      esomeprazole (NEXIUM) 40 mg capsule   4. Allergic rhinitis due to other allergic trigger, unspecified seasonality J30.89 477.8 fluticasone propionate (FLONASE) 50 mcg/actuation nasal spray       1,3,4: Management of allergies possible GERD reviewed at visit. Plan follow-up prior to referrals as reviewed at visit. 2.  Influenza vaccine administered today as requested by patient.       Follow-up and Dispositions    · Return in about 10 days (around 9/16/2019), or if symptoms worsen or fail to improve, for medication follow-up.       reviewed diet, exercise and weight control  reviewed medications and side effects in detail    For additional documentation of information and/or recommendations discussed this visit, please see notes in instructions. Plan and evaluation (above) reviewed with pt at visit  Patient voiced understanding of plan and provided with time to ask/review questions. After Visit Summary (AVS) provided to pt after visit with additional instructions as needed/reviewed.

## 2019-09-06 NOTE — PATIENT INSTRUCTIONS
1.  If the Flonase is not covered, it is available OTC. Nasacort or Rhinocort are available OTC also if preferred. As directed, when you start the Flonase, take two sprays/nostril two times daily for the first 3 days, then use 2 sprays/nostril once daily after that. This will help the medication start working sooner. 2.  Start the Sucralfate as reviewed. If not helping, continue and take with the Nexium as reviewed.

## 2019-09-06 NOTE — PROGRESS NOTES
Immunization/s administered to left deltoid 9/6/2019 by Magdiel Tom LPN with patient's consent. Patient tolerated procedure well. No reactions noted. VIS provided.

## 2019-09-19 ENCOUNTER — OFFICE VISIT (OUTPATIENT)
Dept: INTERNAL MEDICINE CLINIC | Age: 34
End: 2019-09-19

## 2019-09-19 VITALS
HEART RATE: 86 BPM | RESPIRATION RATE: 16 BRPM | TEMPERATURE: 97.7 F | BODY MASS INDEX: 27.28 KG/M2 | WEIGHT: 180 LBS | SYSTOLIC BLOOD PRESSURE: 127 MMHG | DIASTOLIC BLOOD PRESSURE: 73 MMHG | OXYGEN SATURATION: 99 % | HEIGHT: 68 IN

## 2019-09-19 DIAGNOSIS — K20.90 ESOPHAGITIS: ICD-10-CM

## 2019-09-19 DIAGNOSIS — R09.89 GLOBUS SENSATION: Primary | ICD-10-CM

## 2019-09-19 RX ORDER — CHOLECALCIFEROL (VITAMIN D3) 125 MCG
2000 CAPSULE ORAL DAILY
COMMUNITY

## 2019-09-19 NOTE — PROGRESS NOTES
History of Present Illness:   Frankey Satchel is a 30 y.o. male here for evaluation:    Chief Complaint   Patient presents with    Other     something stuck in throat. Here for follow-up. He was seen 9/6 to evaluate above. Managed with allergy medications and GERD therapy as below. He notes with medications, he started with Nexium first.  He took for 3-4 days and noted in first 2 days, was worse, but then was same after few days. He notes after coffee from [employer], he \"felt like he was dying\", felt \"like he couldn't breathe\". He notes he has continued the Nexium, and added the Flonase. Flonase started about 5 days after starting Nexium. He is on day 7 Flonase now. He notes has not worsened, but really no improvement at this time. Nursing screenings reviewed by provider at visit. Prior to Admission medications    Medication Sig Start Date End Date Taking? Authorizing Provider   cholecalciferol, vitamin D3, (VITAMIN D3) 2,000 unit tab Take 2,000 Units by mouth daily. Yes Provider, Historical   fluticasone propionate (FLONASE) 50 mcg/actuation nasal spray 2 Sprays by Both Nostrils route daily. 9/6/19  Yes Gloria López MD   esomeprazole (NEXIUM) 40 mg capsule Take 1 Cap by mouth daily. Start in 3-5 days as directed, if symptoms continue. 9/6/19  Yes Gloria López MD   sucralfate (CARAFATE) 1 gram tablet Take 1 Tab by mouth four (4) times daily. Take with meals and HS as directed. 9/6/19   Gloria López MD   polyethylene glycol [facility]) 17 gram/dose powder Take 17 g by mouth daily. Mix in  6-8 ounces juice and take 1-2 times daily as reviewed. 12/5/18   Gloria López MD        ROS    Vitals:    09/19/19 1622   BP: 127/73   Pulse: 86   Resp: 16   Temp: 97.7 °F (36.5 °C)   TempSrc: Oral   SpO2: 99%   Weight: 180 lb (81.6 kg)   Height: 5' 8\" (1.727 m)   PainSc:   0 - No pain      Body mass index is 27.37 kg/m².     Physical Exam:     Physical Exam Constitutional: He appears well-developed and well-nourished. No distress. HENT:   Head: Normocephalic and atraumatic. Eyes: Conjunctivae are normal. Right eye exhibits no discharge. Left eye exhibits no discharge. No scleral icterus. Cardiovascular: Normal rate, regular rhythm, normal heart sounds and intact distal pulses. Exam reveals no gallop and no friction rub. No murmur heard. Pulmonary/Chest: Effort normal and breath sounds normal. No respiratory distress. He has no wheezes. He has no rales. Abdominal: Soft. Bowel sounds are normal. He exhibits no distension. There is no tenderness. Musculoskeletal: He exhibits no edema or tenderness. Neurological: He is alert. He exhibits normal muscle tone. Coordination normal.   Skin: Skin is warm. No rash noted. He is not diaphoretic. No erythema. No pallor. Psychiatric: He has a normal mood and affect. His behavior is normal. Judgment and thought content normal.       Assessment and Plan:       ICD-10-CM ICD-9-CM    1. Globus sensation F45.8 306.4 REFERRAL TO GASTROENTEROLOGY      REFERRAL TO ENT-OTOLARYNGOLOGY   2. Esophagitis K20.9 530.10 REFERRAL TO GASTROENTEROLOGY       1,2: Referrals and coordination/timing referrals reviewed with patient visit. Medication dosing reviewed pending referrals/evaluations as well. Follow-up and Dispositions    · Return in about 6 weeks (around 10/31/2019), or if symptoms worsen or fail to improve, for referral follow-up.       reviewed medications and side effects in detail    For additional documentation of information and/or recommendations discussed this visit, please see notes in instructions. Plan and evaluation (above) reviewed with pt at visit  Patient voiced understanding of plan and provided with time to ask/review questions. After Visit Summary (AVS) provided to pt after visit with additional instructions as needed/reviewed.

## 2019-09-19 NOTE — PATIENT INSTRUCTIONS
1.  Please follow the following instructions to process/authorize your referral, if needed:    Referrals processing  Please verify with your insurance IF you need referral authorization submitted. For insurance plans which require this, please follow the following steps. FAILURE TO DO SO MAY RESULT IN INABILITY TO SEE THE SPECIALIST YOU HAVE BEEN REFERRED TO (once you are scheduled to see them). 1. Call and schedule appointment with specialist  2. Call our clinic and leave message with provider name, and date of appointment  3. We will then submit the referral to your insurance. This process takes 2-5 business days. If you have questions about scheduling or authorizing referral, you can review with our referral coordinator Tiff Ye). You can review with her today if available/if you have time, or you can call to review once you have made your referral/appointment. If you are not sure if you need referral authorizations, please review with the referral coordinator(s), either prior to or after you have made the appointment, as reviewed. 2.  Continue the Nexium as reviewed, until you have seen GI provider. Continue the Flonase until you see the ENT provider.

## 2019-09-19 NOTE — PROGRESS NOTES
Chief Complaint   Patient presents with    Other     something stuck in throat. 1. Have you been to the ER, urgent care clinic since your last visit? Hospitalized since your last visit? No    2. Have you seen or consulted any other health care providers outside of the [address] [place] [address] since your last visit? Include any pap smears or colon screening. No     Pt states medication helped for approx 6 days but he still feels like something caught in his throat.

## 2019-10-11 DIAGNOSIS — R09.89 GLOBUS SENSATION: ICD-10-CM

## 2019-10-11 DIAGNOSIS — K20.90 ESOPHAGITIS: ICD-10-CM

## 2019-10-15 NOTE — TELEPHONE ENCOUNTER
Please clarify refill request for sucralfate from pt. At 9/19 visit, he had not filled sucralfate. He was referred to ENT and GI at 9/19 visit.

## 2019-10-15 NOTE — TELEPHONE ENCOUNTER
I talked to patient and he states he sees GI doctor tomorrow and will wait til he sees them before filling sucralfate

## 2019-10-17 RX ORDER — SUCRALFATE 1 G/1
TABLET ORAL
Qty: 30 TAB | Refills: 0 | OUTPATIENT
Start: 2019-10-17

## 2019-11-20 ENCOUNTER — HOSPITAL ENCOUNTER (OUTPATIENT)
Age: 34
Setting detail: OUTPATIENT SURGERY
Discharge: HOME | End: 2019-11-20
Attending: INTERNAL MEDICINE | Admitting: INTERNAL MEDICINE
Payer: MEDICAID

## 2019-11-20 ENCOUNTER — ANESTHESIA (OUTPATIENT)
Dept: ENDOSCOPY | Age: 34
End: 2019-11-20
Payer: MEDICAID

## 2019-11-20 ENCOUNTER — ANESTHESIA EVENT (OUTPATIENT)
Dept: ENDOSCOPY | Age: 34
End: 2019-11-20
Payer: MEDICAID

## 2019-11-20 VITALS
WEIGHT: 168.9 LBS | RESPIRATION RATE: 20 BRPM | HEART RATE: 77 BPM | TEMPERATURE: 97.2 F | SYSTOLIC BLOOD PRESSURE: 120 MMHG | DIASTOLIC BLOOD PRESSURE: 69 MMHG | HEIGHT: 68 IN | OXYGEN SATURATION: 99 % | BODY MASS INDEX: 25.6 KG/M2

## 2019-11-20 PROCEDURE — 76040000019: Performed by: INTERNAL MEDICINE

## 2019-11-20 PROCEDURE — 77030021593 HC FCPS BIOP ENDOSC BSC -A: Performed by: INTERNAL MEDICINE

## 2019-11-20 PROCEDURE — 88342 IMHCHEM/IMCYTCHM 1ST ANTB: CPT

## 2019-11-20 PROCEDURE — 88305 TISSUE EXAM BY PATHOLOGIST: CPT

## 2019-11-20 PROCEDURE — 74011250636 HC RX REV CODE- 250/636: Performed by: NURSE ANESTHETIST, CERTIFIED REGISTERED

## 2019-11-20 PROCEDURE — 76060000031 HC ANESTHESIA FIRST 0.5 HR: Performed by: INTERNAL MEDICINE

## 2019-11-20 RX ORDER — ATROPINE SULFATE 0.1 MG/ML
0.5 INJECTION INTRAVENOUS
Status: DISCONTINUED | OUTPATIENT
Start: 2019-11-20 | End: 2019-11-20 | Stop reason: HOSPADM

## 2019-11-20 RX ORDER — PROPOFOL 10 MG/ML
INJECTION, EMULSION INTRAVENOUS AS NEEDED
Status: DISCONTINUED | OUTPATIENT
Start: 2019-11-20 | End: 2019-11-20 | Stop reason: HOSPADM

## 2019-11-20 RX ORDER — SODIUM CHLORIDE 0.9 % (FLUSH) 0.9 %
5-40 SYRINGE (ML) INJECTION EVERY 8 HOURS
Status: DISCONTINUED | OUTPATIENT
Start: 2019-11-20 | End: 2019-11-20 | Stop reason: HOSPADM

## 2019-11-20 RX ORDER — MIDAZOLAM HYDROCHLORIDE 1 MG/ML
.25-5 INJECTION, SOLUTION INTRAMUSCULAR; INTRAVENOUS
Status: DISCONTINUED | OUTPATIENT
Start: 2019-11-20 | End: 2019-11-20 | Stop reason: HOSPADM

## 2019-11-20 RX ORDER — SODIUM CHLORIDE 9 MG/ML
INJECTION, SOLUTION INTRAVENOUS
Status: DISCONTINUED | OUTPATIENT
Start: 2019-11-20 | End: 2019-11-20 | Stop reason: HOSPADM

## 2019-11-20 RX ORDER — FENTANYL CITRATE 50 UG/ML
200 INJECTION, SOLUTION INTRAMUSCULAR; INTRAVENOUS
Status: DISCONTINUED | OUTPATIENT
Start: 2019-11-20 | End: 2019-11-20 | Stop reason: HOSPADM

## 2019-11-20 RX ORDER — SODIUM CHLORIDE 0.9 % (FLUSH) 0.9 %
5-40 SYRINGE (ML) INJECTION AS NEEDED
Status: DISCONTINUED | OUTPATIENT
Start: 2019-11-20 | End: 2019-11-20 | Stop reason: HOSPADM

## 2019-11-20 RX ORDER — SODIUM CHLORIDE 9 MG/ML
150 INJECTION, SOLUTION INTRAVENOUS CONTINUOUS
Status: DISCONTINUED | OUTPATIENT
Start: 2019-11-20 | End: 2019-11-20 | Stop reason: HOSPADM

## 2019-11-20 RX ORDER — EPINEPHRINE 0.1 MG/ML
1 INJECTION INTRACARDIAC; INTRAVENOUS
Status: DISCONTINUED | OUTPATIENT
Start: 2019-11-20 | End: 2019-11-20 | Stop reason: HOSPADM

## 2019-11-20 RX ORDER — DEXTROMETHORPHAN/PSEUDOEPHED 2.5-7.5/.8
1.2 DROPS ORAL
Status: DISCONTINUED | OUTPATIENT
Start: 2019-11-20 | End: 2019-11-20 | Stop reason: HOSPADM

## 2019-11-20 RX ADMIN — SODIUM CHLORIDE: 900 INJECTION, SOLUTION INTRAVENOUS at 15:45

## 2019-11-20 RX ADMIN — PROPOFOL 50 MG: 10 INJECTION, EMULSION INTRAVENOUS at 15:58

## 2019-11-20 RX ADMIN — PROPOFOL 30 MG: 10 INJECTION, EMULSION INTRAVENOUS at 15:59

## 2019-11-20 RX ADMIN — PROPOFOL 30 MG: 10 INJECTION, EMULSION INTRAVENOUS at 16:04

## 2019-11-20 RX ADMIN — PROPOFOL 120 MG: 10 INJECTION, EMULSION INTRAVENOUS at 15:56

## 2019-11-20 RX ADMIN — PROPOFOL 50 MG: 10 INJECTION, EMULSION INTRAVENOUS at 15:57

## 2019-11-20 RX ADMIN — PROPOFOL 40 MG: 10 INJECTION, EMULSION INTRAVENOUS at 16:02

## 2019-11-20 RX ADMIN — PROPOFOL 30 MG: 10 INJECTION, EMULSION INTRAVENOUS at 16:00

## 2019-11-20 NOTE — ANESTHESIA POSTPROCEDURE EVALUATION
Post-Anesthesia Evaluation and Assessment    Patient: Ja Kent MRN: [id number]  SSN: [id number]    YOB: 1985  Age: 30 y.o. Sex: male      I have evaluated the patient and they are stable and ready for discharge from the PACU. Cardiovascular Function/Vital Signs  Visit Vitals  BP 99/63   Pulse 67   Temp 36.9 °C (98.5 °F)   Resp (!) 53   Ht 5' 8\" (1.727 m)   Wt 76.6 kg (168 lb 14.4 oz)   SpO2 99%   BMI 25.68 kg/m²       Patient is status post MAC anesthesia for Procedure(s):  ESOPHAGOGASTRODUODENOSCOPY (EGD)  ESOPHAGOGASTRODUODENAL (EGD) BIOPSY. Nausea/Vomiting: None    Postoperative hydration reviewed and adequate. Pain:  Pain Scale 1: Numeric (0 - 10) (11/20/19 1458)  Pain Intensity 1: 0 (11/20/19 1458)   Managed    Neurological Status: At baseline    Mental Status, Level of Consciousness: Alert and  oriented to person, place, and time    Pulmonary Status:   O2 Device: Nasal cannula (11/20/19 5789)   Adequate oxygenation and airway patent    Complications related to anesthesia: None    Post-anesthesia assessment completed. No concerns    Signed By: Anni Lindquist MD     November 20, 2019              Procedure(s):  ESOPHAGOGASTRODUODENOSCOPY (EGD)  ESOPHAGOGASTRODUODENAL (EGD) BIOPSY. MAC    <BSHSIANPOST>    Vitals Value Taken Time   BP 0/0 11/20/2019  4:41 PM   Temp     Pulse 0 11/20/2019  4:41 PM   Resp 0 11/20/2019  4:45 PM   SpO2     Vitals shown include unvalidated device data.

## 2019-11-20 NOTE — PERIOP NOTES

## 2019-11-20 NOTE — PROGRESS NOTES
Pt. C/o lower abdominal pain in LLQ. Dr. Jazzmine Camejo made aware. Pt requested to use bathroom and ambulated w/one assist to bathroom. Dr. Jazzmine Camejo instructed me to call hiom if patient was not feeling any better after using the bathroom.

## 2019-11-20 NOTE — ROUTINE PROCESS
Bertha Brewster 1985 
[id number] Situation: 
Verbal report received from: Lynnezoey Dayo 
Procedure: Procedure(s): ESOPHAGOGASTRODUODENOSCOPY (EGD) ESOPHAGOGASTRODUODENAL (EGD) BIOPSY Background: 
 
Preoperative diagnosis: DYSPHAGIA Postoperative diagnosis: Duodenitis 
gastritis :  Dr. Beau Hernandes Assistant(s): Endoscopy Technician-1: Wagner Perze Endoscopy RN-1: Prudencio Lynch RN Specimens:  
ID Type Source Tests Collected by Time Destination 1 : Gastric Preservative Gastric  Janice Oakes MD 11/20/2019 1600 Pathology 2 : upper esophagus Preservative Esophagus, Proximal  Janice Oakes MD 11/20/2019 9129 Pathology H. Pylori  no Assessment: 
Intra-procedure medicatio Anesthesia gave intra-procedure sedation and medications, see anesthesia flow sheet yes Intravenous fluids: NS@ Carnella Oar Vital signs stable Abdominal assessment: round and soft Recommendation: 
Discharge patient per MD order. Family or Friend Permission to share finding with family or friend yes

## 2019-11-20 NOTE — DISCHARGE INSTRUCTIONS
Lesly García [address] M.D.  Rodger Dias, [address]  [phone]         EGD [address]  [id number]  1985    DISCOMFORT:  Sore throat- throat lozenges or warm salt water gargle  Redness at IV site- apply warm compress to area; if redness or soreness persist- contact your physician  Gaseous discomfort- walking, belching will help relieve any discomfort  You may not operate a vehicle for 12 hours  You may not engage in an occupation involving machinery or appliances for the  rest of today  You may not drink alcoholic beverages for at least 12 hours  Avoid making any critical decisions for at least 24 hours    DIET:   You may resume your normal diet, but some patients find that heavy or large  meals may lead to indigestion or vomiting. I suggest a light meal as first food  Intake. I recommend a whole food, plant-based diet for your overall health. ACTIVITY:  You may resume your normal daily activities. It is recommended that you spend the remainder of the day resting - avoid any strenuous activity. CALL M.D. IF ANY SIGN OF:   Increasing pain, nausea, vomiting  Abdominal distension (swelling)  Significant bleeding (oral or rectal)  Fever   Pain in chest area  Shortness of breath    Additional Instructions:   Call Dr. Migue Dinh if any questions or problems at [phone]  You should receive the biopsy results by phone or mail within 3 weeks, if not, call my office for the results  EGD showed normal esophagus s/p biopsies, mild to moderate redness in the stomach and duodenum s/p biopsies. Take pepcid as needed.

## 2019-11-20 NOTE — ANESTHESIA PREPROCEDURE EVALUATION
Relevant Problems   No relevant active problems       Anesthetic History   No history of anesthetic complications            Review of Systems / Medical History  Patient summary reviewed, nursing notes reviewed and pertinent labs reviewed    Pulmonary  Within defined limits                 Neuro/Psych   Within defined limits           Cardiovascular  Within defined limits                Exercise tolerance: >4 METS     GI/Hepatic/Renal     GERD           Endo/Other  Within defined limits           Other Findings              Physical Exam    Airway  Mallampati: II  TM Distance: > 6 cm  Neck ROM: normal range of motion   Mouth opening: Normal     Cardiovascular  Regular rate and rhythm,  S1 and S2 normal,  no murmur, click, rub, or gallop             Dental  No notable dental hx       Pulmonary  Breath sounds clear to auscultation               Abdominal  GI exam deferred       Other Findings            Anesthetic Plan    ASA: 2  Anesthesia type: MAC          Induction: Intravenous  Anesthetic plan and risks discussed with: Patient

## 2019-11-20 NOTE — H&P
[facility], [address] [place] [address]          Pre-procedure History and Physical       NAME:  Yajaira Woodward   :   1985   MRN:   [id number]     CHIEF COMPLAINT/HPI: See procedure note    PMH:  Past Medical History:   Diagnosis Date    GERD (gastroesophageal reflux disease)        PSH:  History reviewed. No pertinent surgical history. Allergies:  No Known Allergies    Home Medications:  None       Hospital Medications:  Current Facility-Administered Medications   Medication Dose Route Frequency    0.9% sodium chloride infusion  150 mL/hr IntraVENous CONTINUOUS    sodium chloride (NS) flush 5-40 mL  5-40 mL IntraVENous Q8H    sodium chloride (NS) flush 5-40 mL  5-40 mL IntraVENous PRN    midazolam (VERSED) injection 0.25-5 mg  0.25-5 mg IntraVENous Multiple    fentaNYL citrate (PF) injection 200 mcg  200 mcg IntraVENous Multiple    simethicone (MYLICON) 55YQ/1.6MH oral drops 80 mg  1.2 mL Oral Multiple    atropine injection 0.5 mg  0.5 mg IntraVENous ONCE PRN    EPINEPHrine (ADRENALIN) 0.1 mg/mL syringe 1 mg  1 mg Endoscopically ONCE PRN     Facility-Administered Medications Ordered in Other Encounters   Medication Dose Route Frequency    0.9% sodium chloride infusion   IntraVENous CONTINUOUS       Family History:  History reviewed. No pertinent family history. Social History:  Social History     Tobacco Use    Smoking status: Never Smoker    Smokeless tobacco: Never Used   Substance Use Topics    Alcohol use: Never     Frequency: Never         PHYSICAL EXAM PRIOR TO SEDATION:  General: Alert, in no acute distress    Lungs:            CTA bilaterally  Heart:  Normal S1, S2    Abdomen: Soft, Non distended, Non tender. Normoactive bowel sounds. Assessment:   Stable for sedation administration.     Plan:   · Endoscopic procedure with sedation     Signed By: Amrita Curry MD     2019  3:55 PM

## 2021-02-26 ENCOUNTER — OFFICE VISIT (OUTPATIENT)
Dept: INTERNAL MEDICINE CLINIC | Age: 36
End: 2021-02-26
Payer: MEDICAID

## 2021-02-26 VITALS
HEART RATE: 86 BPM | OXYGEN SATURATION: 98 % | RESPIRATION RATE: 16 BRPM | BODY MASS INDEX: 28.51 KG/M2 | TEMPERATURE: 98.1 F | SYSTOLIC BLOOD PRESSURE: 127 MMHG | WEIGHT: 188.13 LBS | HEIGHT: 68 IN | DIASTOLIC BLOOD PRESSURE: 85 MMHG

## 2021-02-26 DIAGNOSIS — Z86.19 HISTORY OF HELICOBACTER PYLORI INFECTION: ICD-10-CM

## 2021-02-26 DIAGNOSIS — R51.9 NONINTRACTABLE EPISODIC HEADACHE, UNSPECIFIED HEADACHE TYPE: Primary | ICD-10-CM

## 2021-02-26 DIAGNOSIS — R09.89 GLOBUS SENSATION: ICD-10-CM

## 2021-02-26 DIAGNOSIS — J30.9 ALLERGIC CONJUNCTIVITIS AND RHINITIS, BILATERAL: ICD-10-CM

## 2021-02-26 DIAGNOSIS — H10.13 ALLERGIC CONJUNCTIVITIS AND RHINITIS, BILATERAL: ICD-10-CM

## 2021-02-26 DIAGNOSIS — H57.89 EYE REDNESS: ICD-10-CM

## 2021-02-26 PROCEDURE — 99214 OFFICE O/P EST MOD 30 MIN: CPT | Performed by: INTERNAL MEDICINE

## 2021-02-26 RX ORDER — AZELASTINE HYDROCHLORIDE 0.5 MG/ML
1 SOLUTION/ DROPS OPHTHALMIC 2 TIMES DAILY
Qty: 6 ML | Refills: 5 | Status: SHIPPED | OUTPATIENT
Start: 2021-02-26

## 2021-02-26 RX ORDER — MINERAL OIL
180 ENEMA (ML) RECTAL DAILY
Qty: 30 TAB | Refills: 5 | Status: SHIPPED | OUTPATIENT
Start: 2021-02-26 | End: 2021-04-12 | Stop reason: SDUPTHER

## 2021-02-26 NOTE — PATIENT INSTRUCTIONS
Managing Your Allergies: Care Instructions Your Care Instructions Managing your allergies is an important part of staying healthy. Your doctor will help you find out what may be the cause of the allergies. Common causes of symptoms are house dust and dust mites, animal dander, mold, and pollen. As soon as you know what triggers your symptoms, try to avoid those things. This can help prevent allergy symptoms, asthma, and other health problems. Ask your doctor about allergy medicine or immunotherapy. These treatments may help reduce or prevent allergy symptoms. Follow-up care is a key part of your treatment and safety. Be sure to make and go to all appointments, and call your doctor if you are having problems. It's also a good idea to know your test results and keep a list of the medicines you take. How can you care for yourself at home? · If you have been told by your doctor that dust or dust mites are causing your allergy, decrease the dust around your bed: 
? Wash sheets, pillowcases, and other bedding in hot water every week. ? Use dust-proof covers for pillows, duvets, and mattresses. Avoid plastic covers because they tear easily and do not \"breathe. \" Wash as instructed on the label. ? Do not use any blankets and pillows that you do not need. ? Use blankets that you can wash in your washing machine. ? Consider removing drapes and carpets, which attract and hold dust, from your bedroom. · If you are allergic to house dust and mites, do not use home humidifiers. Your doctor can suggest ways you can control dust and mites. · Look for signs of cockroaches. Cockroaches cause allergic reactions. Use cockroach baits to get rid of them. Then, clean your home well. Cockroaches like areas where grocery bags, newspapers, empty bottles, or cardboard boxes are stored. Do not keep these inside your home, and keep trash and food containers sealed. Seal off any spots where cockroaches might enter your home. · If you are allergic to mold, get rid of furniture, rugs, and drapes that smell musty. Check for mold in the bathroom. · If you are allergic to outdoor pollen or mold spores, use air-conditioning. Change or clean all filters every month. Keep windows closed. · If you are allergic to pollen, stay inside when pollen counts are high. Use a vacuum  with a HEPA filter or a double-thickness filter at least two times each week. · Stay inside when air pollution is bad. Avoid paint fumes, perfumes, and other strong odors. · Avoid conditions that make your allergies worse. Stay away from smoke. Do not smoke or let anyone else smoke in your house. Do not use fireplaces or wood-burning stoves. · If you are allergic to your pets, change the air filter in your furnace every month. Use high-efficiency filters. · If you are allergic to pet dander, keep pets outside or out of your bedroom. Old carpet and cloth furniture can hold a lot of animal dander. You may need to replace them. When should you call for help? Give an epinephrine shot if: 
  · You think you are having a severe allergic reaction. After giving an epinephrine shot call 911, even if you feel better. Call 911 if: 
  · You have symptoms of a severe allergic reaction. These may include: 
? Sudden raised, red areas (hives) all over your body. ? Swelling of the throat, mouth, lips, or tongue. ? Trouble breathing. ? Passing out (losing consciousness). Or you may feel very lightheaded or suddenly feel weak, confused, or restless.  
  · You have been given an epinephrine shot, even if you feel better. Call your doctor now or seek immediate medical care if: 
  · You have symptoms of an allergic reaction, such as: ? A rash or hives (raised, red areas on the skin). ? Itching. ? Swelling. ? Belly pain, nausea, or vomiting.   
Watch closely for changes in your health, and be sure to contact your doctor if: 
  · Your allergies get worse.  
  · You need help controlling your allergies.  
  · You have questions about allergy testing.  
  · You do not get better as expected. Where can you learn more? Go to http://www.gray.com/ Enter L249 in the search box to learn more about \"Managing Your Allergies: Care Instructions. \" Current as of: 2020               Content Version: 12.6 © 0726-1750 [employer]. Care instructions adapted under license by [employer] (which disclaims liability or warranty for this information). If you have questions about a medical condition or this instruction, always ask your healthcare professional. [address] any warranty or liability for your use of this information. Allergies: Care Instructions Your Care Instructions Allergies occur when your body's defense system (immune system) overreacts to certain substances. The immune system treats a harmless substance as if it were a harmful germ or virus. Many things can make this happen. These include pollens, medicine, food, dust, animal dander, and mold. Allergies can be mild or severe. Mild allergies can be managed with home treatment. But medicine may be needed to prevent problems. Managing your allergies is an important part of staying healthy. Your doctor may suggest that you have allergy testing to help find out what is causing your allergies. Severe allergies can cause reactions that affect your whole body (anaphylactic reactions). Your doctor may prescribe a shot of epinephrine to carry with you in case you have a severe reaction. Learn how to give yourself the shot and keep it with you at all times. Make sure it is not . Follow-up care is a key part of your treatment and safety. Be sure to make and go to all appointments, and call your doctor if you are having problems. It's also a good idea to know your test results and keep a list of the medicines you take.  
How can you care for yourself at home? · If you have been told by your doctor that dust or dust mites are causing your allergy, decrease the dust around your bed: 
? Wash sheets, pillowcases, and other bedding in hot water every week. ? Use dust-proof covers for pillows, duvets, and mattresses. Avoid plastic covers because they tear easily and do not \"breathe. \" Wash as instructed on the label. ? Do not use any blankets and pillows that you do not need. ? Use blankets that you can wash in your washing machine. ? Consider removing drapes and carpets, which attract and hold dust, from your bedroom. · If you are allergic to house dust and mites, do not use home humidifiers. Your doctor can suggest ways you can control dust and mites. · Look for signs of cockroaches. Cockroaches cause allergic reactions. Use cockroach baits to get rid of them. Then, clean your home well. Cockroaches like areas where grocery bags, newspapers, empty bottles, or cardboard boxes are stored. Do not keep these inside your home, and keep trash and food containers sealed. Seal off any spots where cockroaches might enter your home. · If you are allergic to mold, get rid of furniture, rugs, and drapes that smell musty. Check for mold in the bathroom. · If you are allergic to outdoor pollen or mold spores, use air-conditioning. Change or clean all filters every month. Keep windows closed. · If you are allergic to pollen, stay inside when pollen counts are high. Use a vacuum  with a HEPA filter or a double-thickness filter at least two times each week. · Stay inside when air pollution is bad. Avoid paint fumes, perfumes, and other strong odors. · Avoid conditions that make your allergies worse. Stay away from smoke. Do not smoke or let anyone else smoke in your house. Do not use fireplaces or wood-burning stoves. · If you are allergic to your pets, change the air filter in your furnace every month. Use high-efficiency filters.  
· If you are allergic to pet dander, keep pets outside or out of your bedroom. Old carpet and cloth furniture can hold a lot of animal dander. You may need to replace them. When should you call for help? Give an epinephrine shot if: 
  · You think you are having a severe allergic reaction.  
  · You have symptoms in more than one body area, such as mild nausea and an itchy mouth. After giving an epinephrine shot call 911, even if you feel better. Call 911 if: 
  · You have symptoms of a severe allergic reaction. These may include: 
? Sudden raised, red areas (hives) all over your body. ? Swelling of the throat, mouth, lips, or tongue. ? Trouble breathing. ? Passing out (losing consciousness). Or you may feel very lightheaded or suddenly feel weak, confused, or restless.  
  · You have been given an epinephrine shot, even if you feel better. Call your doctor now or seek immediate medical care if: 
  · You have symptoms of an allergic reaction, such as: ? A rash or hives (raised, red areas on the skin). ? Itching. ? Swelling. ? Belly pain, nausea, or vomiting. Watch closely for changes in your health, and be sure to contact your doctor if: 
  · You do not get better as expected. Where can you learn more? Go to http://www.gray.com/ Enter Z799 in the search box to learn more about \"Allergies: Care Instructions. \" Current as of: June 29, 2020               Content Version: 12.6 © 3709-5328 [employer]. Care instructions adapted under license by [employer] (which disclaims liability or warranty for this information). If you have questions about a medical condition or this instruction, always ask your healthcare professional. [place] [address] any warranty or liability for your use of this information. Seasonal Allergies: Care Instructions Your Care Instructions Allergies occur when your body's defense system (immune system) overreacts to certain substances. The immune system treats a harmless substance as if it were a harmful germ or virus. Many things can cause this to happen. Examples include pollens, medicine, food, dust, animal dander, and mold. Your allergies are seasonal if you have symptoms just at certain times of the year. In that case, you are probably allergic to pollens from certain trees, grasses, or weeds. Allergies can be mild or severe. Over-the-counter allergy medicine may help with some symptoms. Read and follow all instructions on the label. Managing your allergies is an important part of staying healthy. Your doctor may suggest that you have tests to help find the cause of your allergies. When you know what things trigger your symptoms, you can avoid them. This can prevent allergy symptoms and other health problems. In some cases, immunotherapy might help. For this treatment, you get shots or use pills that have a small amount of certain allergens in them. Your body \"gets used to\" the allergen, so you react less to it over time. This kind of treatment may help prevent or reduce some allergy symptoms. Follow-up care is a key part of your treatment and safety. Be sure to make and go to all appointments, and call your doctor if you are having problems. It's also a good idea to know your test results and keep a list of the medicines you take. How can you care for yourself at home? · Be safe with medicines. Take your medicines exactly as prescribed. Call your doctor if you think you are having a problem with your medicine. · During your allergy season, keep windows closed. If you need to use air-conditioning, change or clean all filters every month. Take a shower and change your clothes after you have been outside. · Stay inside when pollen counts are high. Vacuum once or twice a week. Use a vacuum  with a HEPA filter or a double-thickness filter. When should you call for help?  
 Give an epinephrine shot if: 
  · You think you are having a severe allergic reaction. After giving an epinephrine shot, call 911, even if you feel better. Call 911 if: 
  · You have symptoms of a severe allergic reaction. These may include: 
? Sudden raised, red areas (hives) all over your body. ? Swelling of the throat, mouth, lips, or tongue. ? Trouble breathing. ? Passing out (losing consciousness). Or you may feel very lightheaded or suddenly feel weak, confused, or restless.  
  · You have been given an epinephrine shot, even if you feel better. Call your doctor now or seek immediate medical care if: 
  · You have symptoms of an allergic reaction, such as: ? A rash or hives (raised, red areas on the skin). ? Itching. ? Swelling. ? Belly pain, nausea, or vomiting. Watch closely for changes in your health, and be sure to contact your doctor if: 
  · You do not get better as expected. Where can you learn more? Go to http://www.gray.com/ Enter J912 in the search box to learn more about \"Seasonal Allergies: Care Instructions. \" Current as of: June 29, 2020               Content Version: 12.6 © 4739-7563 [employer]. Care instructions adapted under license by [employer] (which disclaims liability or warranty for this information). If you have questions about a medical condition or this instruction, always ask your healthcare professional. [place] [address] any warranty or liability for your use of this information.

## 2021-02-26 NOTE — PROGRESS NOTES
Richie White (: 1985) is a 37 y.o. male, established patient, here for evaluation of the following chief complaint(s):  Chief Complaint   Patient presents with    Headache     Patient reports reoccuring headaches, occuring for the past 2-3 weeks. Assessment and Plan:       ICD-10-CM ICD-9-CM    1. Nonintractable episodic headache, unspecified headache type  R51.9 784.0    2. Eye redness  H57.89 379.93 azelastine (OPTIVAR) 0.05 % ophthalmic solution      fexofenadine (ALLEGRA) 180 mg tablet   3. Allergic conjunctivitis and rhinitis, bilateral  H10.13 372.05 azelastine (OPTIVAR) 0.05 % ophthalmic solution    J30.9 477.9 fexofenadine (ALLEGRA) 180 mg tablet   4. Globus sensation  R09.89 306.4    5. History of Helicobacter pylori infection  Z86.19 V12.09        1. Mgt with symptomatic meds and allergy mgt reviewed. 2,3:  New problems--Medication(s), management and follow-up based on response reviewed at visit. Reviewed typical course of illness, duration of symptoms, and exam findings. 4,5. Improved 80% after tx for H pylori. Had interim testing with GI and cleared. Follow-up and Dispositions    · Return in about 4 weeks (around 3/26/2021), or if symptoms worsen or fail to improve, for yearly physical, fasting labs, medication follow-up.       lab results and schedule of future lab studies reviewed with patient  reviewed diet, exercise and weight control  reviewed medications and side effects in detail    For additional documentation of information and/or recommendations discussed this visit, please see notes in instructions. Plan and evaluation (above) reviewed with pt at visit  Patient voiced understanding of plan and provided with time to ask/review questions. After Visit Summary (AVS) provided to pt after visit with additional instructions as needed/reviewed.       Future Appointments   Date Time Provider Beatriz Key   3/25/2021 10:00 AM Sharee Roque MD [facility]   --Updated future visits after patient check-out. History of Present Illness:     Notes (nursing/rooming note copied below in italics):  As above    LOV here Sept 2019 for globus sensation. He has had interim EGD Nov 2019 with Dr. Madge Paget. Path with H pylori and chronic active gastritis. He notes HA started 2-3 weeks ago. Started in AM, but did not wake up with HA. He will sleep and have no HA In AM.    Recurred 2 days later  This repeated 2-3 weeks in a row. He took Aspirin OTC but doesn't remember dose. He took 2 tabs and HA resolved. No URI symptoms. No allergies noted. No cough or fever. He notes he has had HA in past, but not recurring. Last HA was last week. No new meds or OTC meds prior to HA. Notes no changes with his work or screen time. Notes problems with his eyes--has redness and burning all the time. Has seen with eye provider 2yrs ago and treated with artificial tears. It didn't help--didn't help with redness. He didn't follow-up. Had complete eye exam and vision screening then and normal.      He notes besides eye redness, has darkness under eyes also. Nursing screenings reviewed by provider at visit. Prior to Admission medications    Medication Sig Start Date End Date Taking? Authorizing Provider   cholecalciferol, vitamin D3, (VITAMIN D3) 2,000 unit tab Take 2,000 Units by mouth daily. Yes Provider, Historical   fluticasone propionate (FLONASE) 50 mcg/actuation nasal spray 2 Sprays by Both Nostrils route daily. 9/6/19   Jon Avalos MD   esomeprazole (NEXIUM) 40 mg capsule Take 1 Cap by mouth daily. Start in 3-5 days as directed, if symptoms continue.  9/6/19   Jon Avalos MD        ROS    Vitals:    02/26/21 1520   BP: 127/85   Pulse: 86   Resp: 16   Temp: 98.1 °F (36.7 °C)   TempSrc: Oral   SpO2: 98%   Weight: 188 lb 2 oz (85.3 kg)   Height: 5' 8\" (1.727 m)   PainSc:   0 - No pain      Body mass index is 28.6 kg/m². Physical Exam:     Physical Exam  Vitals signs and nursing note reviewed. Constitutional:       General: He is not in acute distress. Appearance: Normal appearance. He is well-developed. He is not diaphoretic. HENT:      Head: Normocephalic and atraumatic. Nose:      Comments: Moderate boggy nasopharyngeal edema present bilaterally with slight amount clear discharge bilat. Eyes:      General: No scleral icterus. Right eye: No discharge. Left eye: No discharge. Comments: Mild-moderate palpebral conjunctival injection bilat. No drainage bilat. Neck:      Musculoskeletal: Neck supple. No neck rigidity. Cardiovascular:      Rate and Rhythm: Normal rate and regular rhythm. Pulses: Normal pulses. Heart sounds: Normal heart sounds. No murmur. No friction rub. No gallop. Pulmonary:      Effort: Pulmonary effort is normal. No respiratory distress. Breath sounds: Normal breath sounds. No stridor. No wheezing, rhonchi or rales. Abdominal:      General: Bowel sounds are normal. There is no distension. Palpations: Abdomen is soft. Tenderness: There is no abdominal tenderness. There is no guarding or rebound. Musculoskeletal:         General: No swelling, tenderness or deformity. Right lower leg: No edema. Left lower leg: No edema. Lymphadenopathy:      Cervical: No cervical adenopathy. Skin:     General: Skin is warm. Coloration: Skin is not jaundiced or pale. Findings: No bruising, erythema or rash. Neurological:      General: No focal deficit present. Mental Status: He is alert. Motor: No abnormal muscle tone. Coordination: Coordination normal.      Gait: Gait normal.   Psychiatric:         Mood and Affect: Mood normal.         Behavior: Behavior normal.         Thought Content: Thought content normal.         Judgment: Judgment normal.         An electronic signature was used to authenticate this note.   -- Junaid Almeida MD

## 2021-02-26 NOTE — PROGRESS NOTES
RM 16    Chief Complaint   Patient presents with    Headache     Patient reports reoccuring headaches, occuring for the past 2-3 weeks. 1. Have you been to the ER, urgent care clinic since your last visit? Hospitalized since your last visit? No    2. Have you seen or consulted any other health care providers outside of the [address] [place] [address] since your last visit? Include any pap smears or colon screening. No    Health Maintenance Due   Topic Date Due    Flu Vaccine (1) 09/01/2020       Abuse Screening Questionnaire 2/26/2021   Do you ever feel afraid of your partner? N   Are you in a relationship with someone who physically or mentally threatens you? N   Is it safe for you to go home?  Y       3 most recent PHQ Screens 2/26/2021   Little interest or pleasure in doing things Not at all   Feeling down, depressed, irritable, or hopeless Not at all   Total Score PHQ 2 0       Learning Assessment 4/18/2018   PRIMARY LEARNER Patient   HIGHEST LEVEL OF EDUCATION - PRIMARY LEARNER  4 YEARS OF COLLEGE   BARRIERS PRIMARY LEARNER NONE   CO-LEARNER CAREGIVER No   PRIMARY LANGUAGE OTHER (COMMENT)   LEARNER PREFERENCE PRIMARY READING     DEMONSTRATION     LISTENING   ANSWERED BY patient   RELATIONSHIP SELF

## 2021-03-01 ENCOUNTER — TELEPHONE (OUTPATIENT)
Dept: INTERNAL MEDICINE CLINIC | Age: 36
End: 2021-03-01

## 2021-03-01 DIAGNOSIS — J30.9 ALLERGIC CONJUNCTIVITIS AND RHINITIS, BILATERAL: ICD-10-CM

## 2021-03-01 DIAGNOSIS — H10.13 ALLERGIC CONJUNCTIVITIS AND RHINITIS, BILATERAL: ICD-10-CM

## 2021-03-01 DIAGNOSIS — H57.89 EYE REDNESS: ICD-10-CM

## 2021-03-01 RX ORDER — AZELASTINE HYDROCHLORIDE 0.5 MG/ML
1 SOLUTION/ DROPS OPHTHALMIC 2 TIMES DAILY
Qty: 6 ML | Refills: 5 | Status: CANCELLED | OUTPATIENT
Start: 2021-03-01

## 2021-03-01 NOTE — TELEPHONE ENCOUNTER
Dr. Lantigua Remedies 0.05% is not covered by patients current insurance plan. Please review and prescribe alternate therapy or obtain a prior authorization. Thank you. Requested Prescriptions     Pending Prescriptions Disp Refills    azelastine (OPTIVAR) 0.05 % ophthalmic solution 6 mL 5     Sig: Administer 1 Drop to both eyes two (2) times a day.  Use in affected eye(s)

## 2021-03-01 NOTE — TELEPHONE ENCOUNTER
Optivar is available through [employer]. Otherwise, pt can clarify what other product is covered, and can re-send as alternative.

## 2021-03-01 NOTE — TELEPHONE ENCOUNTER
Writer informed patient of good rx coupon for Optivar gtts. Patient verbalizes understanding and agrees to try using coupon. No further questions or concerns prior to ending call.

## 2021-04-12 ENCOUNTER — OFFICE VISIT (OUTPATIENT)
Dept: INTERNAL MEDICINE CLINIC | Age: 36
End: 2021-04-12
Payer: MEDICAID

## 2021-04-12 VITALS
WEIGHT: 183.25 LBS | HEIGHT: 68 IN | HEART RATE: 64 BPM | RESPIRATION RATE: 14 BRPM | TEMPERATURE: 97.9 F | OXYGEN SATURATION: 97 % | SYSTOLIC BLOOD PRESSURE: 128 MMHG | BODY MASS INDEX: 27.77 KG/M2 | DIASTOLIC BLOOD PRESSURE: 87 MMHG

## 2021-04-12 DIAGNOSIS — H57.89 EYE REDNESS: ICD-10-CM

## 2021-04-12 DIAGNOSIS — H10.13 ALLERGIC CONJUNCTIVITIS AND RHINITIS, BILATERAL: ICD-10-CM

## 2021-04-12 DIAGNOSIS — E55.9 HYPOVITAMINOSIS D: ICD-10-CM

## 2021-04-12 DIAGNOSIS — J30.9 ALLERGIC CONJUNCTIVITIS AND RHINITIS, BILATERAL: ICD-10-CM

## 2021-04-12 DIAGNOSIS — Z00.00 WELL ADULT EXAM: Primary | ICD-10-CM

## 2021-04-12 DIAGNOSIS — E78.00 ELEVATED LDL CHOLESTEROL LEVEL: ICD-10-CM

## 2021-04-12 DIAGNOSIS — L98.9 SKIN LESION OF SCALP: ICD-10-CM

## 2021-04-12 DIAGNOSIS — R53.83 LOW ENERGY: ICD-10-CM

## 2021-04-12 PROCEDURE — 99395 PREV VISIT EST AGE 18-39: CPT | Performed by: INTERNAL MEDICINE

## 2021-04-12 PROCEDURE — 99214 OFFICE O/P EST MOD 30 MIN: CPT | Performed by: INTERNAL MEDICINE

## 2021-04-12 RX ORDER — MINERAL OIL
180 ENEMA (ML) RECTAL
Qty: 30 TAB | Refills: 5
Start: 2021-04-12 | End: 2021-04-26

## 2021-04-12 NOTE — PROGRESS NOTES
Ashley Blake (: 1985) is a 37 y.o. male, established patient, here for evaluation of the following chief complaint(s):  Chief Complaint   Patient presents with    Complete Physical    Medication Evaluation     4 week follow up    [facility]     Patient is fasting        Assessment and Plan:       ICD-10-CM ICD-9-CM    1. Well adult exam  Z00.00 V70.0 CBC WITH AUTOMATED DIFF      METABOLIC PANEL, COMPREHENSIVE      HEMOGLOBIN A1C WITH EAG      LIPID PANEL      VITAMIN B12      TESTOSTERONE, FREE & TOTAL   2. Elevated LDL cholesterol level  E78.00 272.0    3. Hypovitaminosis D  E55.9 268.9 VITAMIN D, 25 HYDROXY   4. Low energy  R53.83 780.79 TESTOSTERONE, FREE & TOTAL   5. Eye redness  H57.89 379.93 fexofenadine (ALLEGRA) 180 mg tablet   6. Allergic conjunctivitis and rhinitis, bilateral  H10.13 372.05 fexofenadine (ALLEGRA) 180 mg tablet    J30.9 477.9    7. Skin lesion of scalp--#2--older one present for 4yrs--stable--monitoring reviewed. L98.9 709.9        Diagnoses #1,7 for Preventive Care/Wellness visit. Due to significant separate problems unrelated to preventive care needs, also addressed following diagnoses/problems at visit as below (diagnoses #2-6 above). 1.  Updated fasting labs reviewed. Immunization(s) reviewed at visit. [facility] ([facility]) form requested after visit to update records. He is not interested in scheduling COVID vaccine at this time--info as per instructions. 2,3:  Updating monitoring labs today reviewed. Vit D supplement dosing reviewed in relation to labs, once resulted. 4.  Screening/testing reveiwed, as requested by pt.    5,6:  Medication effective--re-ordered as no-print script, since not on current medication list from prior script here. 7.  Reviewed findings as below. Possibly calcified subcut skin lesion (cyst vs hematoma). Plan monitor and refer to derm if changing, or new symptoms or concerns.    Pt agreeable with plan--not interested in further eval at this time. Follow-up and Dispositions    · Return in about 1 year (around 4/12/2022), or if symptoms worsen or fail to improve, for yearly physical, fasting labs. lab results and schedule of future lab studies reviewed with patient  reviewed diet, exercise and weight control  reviewed medications and side effects in detail    For additional documentation of information and/or recommendations discussed this visit, please see notes in instructions. Plan and evaluation (above) reviewed with pt at visit  Patient voiced understanding of plan and provided with time to ask/review questions. After Visit Summary (AVS) provided to pt after visit with additional instructions as needed/reviewed. No future appointments. --Updated future visits after patient check-out. History of Present Illness:     Notes (nursing/rooming note copied below in italics):  As above    Notes no interim problems. Last labs here 2018. Mild elevation LDL and low Vit D then. Vit B12 low-normal.    Reviewed updating labs as above. He also notes low energy, but not clearly low libido or ED concerns--interested in testosterone screening/testing. Reviewed lab testing and re-eval if low at visit. He notes 2 stable subcut masses in scalp. Notes no change in size over time. Not draining or painful. He notes no pain or concerns--just wanted us to be aware. Not interested in derm eval or removal at this time. Nursing screenings reviewed by provider at visit. Past Medical History:   Diagnosis Date    GERD (gastroesophageal reflux disease)     Immunity status testing 02/20/2018    Health Dept Labs:  Varicella non-immune. VZV #1 rec'd at health dept 2/22/18.  Routine screening for STI (sexually transmitted infection) 02/20/2018    Health Dept Labs:  HIV non-reactive. T pallidum (syphilis) Ab negative. Hep B negative (negative total cAb, sAb, sAg).     Screening for tuberculosis 02/20/2018    Health Dept Labs:  Negative.  Screening, iron deficiency anemia 02/20/2018    Health Dept Labs:  Normal H18. Normal Chem 8 with glc 100. History reviewed. No pertinent surgical history. Prior to Admission medications    Medication Sig Start Date End Date Taking? Authorizing Provider   OTHER Allergy tab, patient unsure of name. Yes Provider, Historical   azelastine (OPTIVAR) 0.05 % ophthalmic solution Administer 1 Drop to both eyes two (2) times a day. Use in affected eye(s) 2/26/21  Yes Gerri Dixon MD   cholecalciferol, vitamin D3, (VITAMIN D3) 2,000 unit tab Take 2,000 Units by mouth daily. Yes Provider, Historical   fluticasone propionate (FLONASE) 50 mcg/actuation nasal spray 2 Sprays by Both Nostrils route daily. Patient not taking: Reported on 4/12/2021 9/6/19   Gerri Dixon MD   esomeprazole (NEXIUM) 40 mg capsule Take 1 Cap by mouth daily. Start in 3-5 days as directed, if symptoms continue. Patient not taking: Reported on 4/12/2021 9/6/19   Gerri Dixon MD        ROS    Vitals:    04/12/21 1034   BP: 128/87   Pulse: 64   Resp: 14   Temp: 97.9 °F (36.6 °C)   TempSrc: Temporal   SpO2: 97%   Weight: 183 lb 4 oz (83.1 kg)   Height: 5' 8\" (1.727 m)   PainSc:   0 - No pain      Body mass index is 27.86 kg/m². Physical Exam:     Physical Exam  Vitals signs and nursing note reviewed. Constitutional:       General: He is not in acute distress. Appearance: Normal appearance. He is well-developed. He is not diaphoretic. HENT:      Head: Normocephalic and atraumatic. Right Ear: Tympanic membrane, ear canal and external ear normal.      Left Ear: Tympanic membrane, ear canal and external ear normal.      Mouth/Throat:      Pharynx: No oropharyngeal exudate. Eyes:      General: No scleral icterus. Right eye: No discharge. Left eye: No discharge.       Conjunctiva/sclera: Conjunctivae normal.   Neck: Musculoskeletal: Normal range of motion and neck supple. No neck rigidity or muscular tenderness. Thyroid: No thyromegaly. Trachea: No tracheal deviation. Cardiovascular:      Rate and Rhythm: Normal rate and regular rhythm. Pulses: Normal pulses. Heart sounds: Normal heart sounds. No murmur. No friction rub. No gallop. Pulmonary:      Effort: Pulmonary effort is normal. No respiratory distress. Breath sounds: Normal breath sounds. No stridor. No wheezing, rhonchi or rales. Abdominal:      General: Bowel sounds are normal. There is no distension. Palpations: Abdomen is soft. There is no mass. Tenderness: There is no abdominal tenderness. There is no guarding or rebound. Genitourinary:     Comments:  exam deferred--no concerns noted. Musculoskeletal:         General: No swelling, tenderness or deformity. Right lower leg: No edema. Left lower leg: No edema. Lymphadenopathy:      Cervical: No cervical adenopathy. Skin:     General: Skin is warm. Coloration: Skin is not pale. Findings: No bruising, erythema or rash. Neurological:      General: No focal deficit present. Mental Status: He is alert. Motor: No weakness or abnormal muscle tone. Coordination: Coordination normal.      Gait: Gait normal.   Psychiatric:         Behavior: Behavior normal.         Thought Content: Thought content normal.         Judgment: Judgment normal.         An electronic signature was used to authenticate this note.   -- Wesley Mishra MD

## 2021-04-12 NOTE — PATIENT INSTRUCTIONS
COVID Vaccine Information as of April 2021: 
 
Background: [facility] is receiving limited supplies of COVID-19 vaccine and is scheduling vaccination appointments for individuals according to the phased recommendations that are currently in place. You can find the phased recommendations here on the [facility]) website:  [employer]ee How to Know When Vaccine Available: The initial notification went out to MyChart patients on January 18, 2021. The most effective way to receive timely notifications in the future from our clinic and [facility] will be through [address] We can help you sign-up in clinic anytime or you can visit the [facility] website and sign up via the Patient Portal tab. We are working on other ways to get you this information, if you do not have MyChart. Locations to Receive Vaccine with [facility]: The vaccines are going to be given in locations that are separate from our clinic at [address] [facility] at this time. The sites that have opened to administer the vaccines are:  
[address] 
[facility], [address] [facility]--Saturday Only [address] [facility], [address] [facility] Internal [facility]--Saturday Only [address]  [facility] [address] [facility]) [address] [place] [address], [address] [place] [address] Scheduling an appointment: If you would like to receive the vaccine, please call [phone] to make your appointment. There is limited availability and we will be scheduling patients in the order in which they call until our current vaccine supply is allocated. You must have an appointment in order to receive the vaccine. Clinics cannot accommodate walk-ins.   
 
Other ways to get COVID vaccines: [facility]:   
[email or web address] Note:  The Health Department is encouraging anyone interested in getting a COVID vaccine to register at vaccinate. [place].gov or call 949-VAX-IN-VA Thank you for allowing [facility] to be your trusted health care partner! Well Visit, Ages 25 to 48: Care Instructions Overview Well visits can help you stay healthy. Your doctor has checked your overall health and may have suggested ways to take good care of yourself. Your doctor also may have recommended tests. At home, you can help prevent illness with healthy eating, regular exercise, and other steps. Follow-up care is a key part of your treatment and safety. Be sure to make and go to all appointments, and call your doctor if you are having problems. It's also a good idea to know your test results and keep a list of the medicines you take. How can you care for yourself at home? · Get screening tests that you and your doctor decide on. Screening helps find diseases before any symptoms appear. · Eat healthy foods. Choose fruits, vegetables, whole grains, protein, and low-fat dairy foods. Limit fat, especially saturated fat. Reduce salt in your diet. · Limit alcohol. If you are a man, have no more than 2 drinks a day or 14 drinks a week. If you are a woman, have no more than 1 drink a day or 7 drinks a week. · Get at least 30 minutes of physical activity on most days of the week. Walking is a good choice. You also may want to do other activities, such as running, swimming, cycling, or playing tennis or team sports. Discuss any changes in your exercise program with your doctor. · Reach and stay at a healthy weight. This will lower your risk for many problems, such as obesity, diabetes, heart disease, and high blood pressure. · Do not smoke or allow others to smoke around you.  If you need help quitting, talk to your doctor about stop-smoking programs and medicines. These can increase your chances of quitting for good. · Care for your mental health. It is easy to get weighed down by worry and stress. Learn strategies to manage stress, like deep breathing and mindfulness, and stay connected with your family and community. If you find you often feel sad or hopeless, talk with your doctor. Treatment can help. · Talk to your doctor about whether you have any risk factors for sexually transmitted infections (STIs). You can help prevent STIs if you wait to have sex with a new partner (or partners) until you've each been tested for STIs. It also helps if you use condoms (male or female condoms) and if you limit your sex partners to one person who only has sex with you. Vaccines are available for some STIs, such as HPV. · Use birth control if it's important to you to prevent pregnancy. Talk with your doctor about the choices available and what might be best for you. · If you think you may have a problem with alcohol or drug use, talk to your doctor. This includes prescription medicines (such as amphetamines and opioids) and illegal drugs (such as cocaine and methamphetamine). Your doctor can help you figure out what type of treatment is best for you. · Protect your skin from too much sun. When you're outdoors from 10 a.m. to 4 p.m., stay in the shade or cover up with clothing and a hat with a wide brim. Wear sunglasses that block UV rays. Even when it's cloudy, put broad-spectrum sunscreen (SPF 30 or higher) on any exposed skin. · See a dentist one or two times a year for checkups and to have your teeth cleaned. · Wear a seat belt in the car. When should you call for help? Watch closely for changes in your health, and be sure to contact your doctor if you have any problems or symptoms that concern you. Where can you learn more? Go to http://www.gray.com/ Enter [phone] in the search box to learn more about \"Well Visit, Ages 25 to 48: Care Instructions. \" Current as of: May 27, 2020               Content Version: 12.8 © 2006-2021 [employer]wise[employer]. Care instructions adapted under license by [employer] (which disclaims liability or warranty for this information). If you have questions about a medical condition or this instruction, always ask your healthcare professional. [place] [address] any warranty or liability for your use of this information.

## 2021-04-12 NOTE — PROGRESS NOTES
RM 16    Chief Complaint   Patient presents with    Complete Physical    Medication Evaluation     4 week follow up    Trevon Salgado     Patient is fasting        1. Have you been to the ER, urgent care clinic since your last visit? Hospitalized since your last visit? No [facility] a month ago, hermes sellers removed     2. Have you seen or consulted any other health care providers outside of the [address] [place] [address] since your last visit? Include any pap smears or colon screening. No    There are no preventive care reminders to display for this patient. Abuse Screening Questionnaire 2/26/2021   Do you ever feel afraid of your partner? N   Are you in a relationship with someone who physically or mentally threatens you? N   Is it safe for you to go home?  Y       3 most recent PHQ Screens 2/26/2021   Little interest or pleasure in doing things Not at all   Feeling down, depressed, irritable, or hopeless Not at all   Total Score PHQ 2 0       Learning Assessment 4/18/2018   PRIMARY LEARNER Patient   HIGHEST LEVEL OF EDUCATION - PRIMARY LEARNER  4 YEARS OF COLLEGE   BARRIERS PRIMARY LEARNER NONE   CO-LEARNER CAREGIVER No   PRIMARY LANGUAGE OTHER (COMMENT)   LEARNER PREFERENCE PRIMARY READING     DEMONSTRATION     LISTENING   ANSWERED BY patient   RELATIONSHIP SELF

## 2021-04-13 LAB
25(OH)D3 SERPL-MCNC: 17.1 NG/ML (ref 30–100)
ALBUMIN SERPL-MCNC: 4 G/DL (ref 3.5–5)
ALBUMIN/GLOB SERPL: 1.3 {RATIO} (ref 1.1–2.2)
ALP SERPL-CCNC: 119 U/L (ref 45–117)
ALT SERPL-CCNC: 22 U/L (ref 12–78)
ANION GAP SERPL CALC-SCNC: 4 MMOL/L (ref 5–15)
AST SERPL-CCNC: 10 U/L (ref 15–37)
BASOPHILS # BLD: 0 K/UL (ref 0–0.1)
BASOPHILS NFR BLD: 1 % (ref 0–1)
BILIRUB SERPL-MCNC: 0.4 MG/DL (ref 0.2–1)
BUN SERPL-MCNC: 15 MG/DL (ref 6–20)
BUN/CREAT SERPL: 17 (ref 12–20)
CALCIUM SERPL-MCNC: 9.7 MG/DL (ref 8.5–10.1)
CHLORIDE SERPL-SCNC: 108 MMOL/L (ref 97–108)
CHOLEST SERPL-MCNC: 208 MG/DL
CO2 SERPL-SCNC: 28 MMOL/L (ref 21–32)
CREAT SERPL-MCNC: 0.86 MG/DL (ref 0.7–1.3)
DIFFERENTIAL METHOD BLD: NORMAL
EOSINOPHIL # BLD: 0.1 K/UL (ref 0–0.4)
EOSINOPHIL NFR BLD: 2 % (ref 0–7)
ERYTHROCYTE [DISTWIDTH] IN BLOOD BY AUTOMATED COUNT: 11.9 % (ref 11.5–14.5)
EST. AVERAGE GLUCOSE BLD GHB EST-MCNC: 97 MG/DL
GLOBULIN SER CALC-MCNC: 3.2 G/DL (ref 2–4)
GLUCOSE SERPL-MCNC: 84 MG/DL (ref 65–100)
HBA1C MFR BLD: 5 % (ref 4–5.6)
HCT VFR BLD AUTO: 45.7 % (ref 36.6–50.3)
HDLC SERPL-MCNC: 35 MG/DL
HDLC SERPL: 5.9 {RATIO} (ref 0–5)
HGB BLD-MCNC: 14.4 G/DL (ref 12.1–17)
IMM GRANULOCYTES # BLD AUTO: 0 K/UL (ref 0–0.04)
IMM GRANULOCYTES NFR BLD AUTO: 0 % (ref 0–0.5)
LDLC SERPL CALC-MCNC: 149.2 MG/DL (ref 0–100)
LIPID PROFILE,FLP: ABNORMAL
LYMPHOCYTES # BLD: 2.6 K/UL (ref 0.8–3.5)
LYMPHOCYTES NFR BLD: 38 % (ref 12–49)
MCH RBC QN AUTO: 28.6 PG (ref 26–34)
MCHC RBC AUTO-ENTMCNC: 31.5 G/DL (ref 30–36.5)
MCV RBC AUTO: 90.9 FL (ref 80–99)
MONOCYTES # BLD: 0.6 K/UL (ref 0–1)
MONOCYTES NFR BLD: 8 % (ref 5–13)
NEUTS SEG # BLD: 3.4 K/UL (ref 1.8–8)
NEUTS SEG NFR BLD: 51 % (ref 32–75)
NRBC # BLD: 0 K/UL (ref 0–0.01)
NRBC BLD-RTO: 0 PER 100 WBC
PLATELET # BLD AUTO: 259 K/UL (ref 150–400)
PMV BLD AUTO: 11.5 FL (ref 8.9–12.9)
POTASSIUM SERPL-SCNC: 4 MMOL/L (ref 3.5–5.1)
PROT SERPL-MCNC: 7.2 G/DL (ref 6.4–8.2)
RBC # BLD AUTO: 5.03 M/UL (ref 4.1–5.7)
SODIUM SERPL-SCNC: 140 MMOL/L (ref 136–145)
TRIGL SERPL-MCNC: 119 MG/DL (ref ?–150)
VIT B12 SERPL-MCNC: 202 PG/ML (ref 193–986)
VLDLC SERPL CALC-MCNC: 23.8 MG/DL
WBC # BLD AUTO: 6.7 K/UL (ref 4.1–11.1)

## 2021-04-14 LAB
COMMENT, TESC2: ABNORMAL
TESTOST FREE SERPL-MCNC: 6.4 PG/ML (ref 8.7–25.1)
TESTOST SERPL-MCNC: 286 NG/DL (ref 264–916)

## 2021-04-30 ENCOUNTER — IMMUNIZATION (OUTPATIENT)
Dept: INTERNAL MEDICINE CLINIC | Age: 36
End: 2021-04-30
Payer: MEDICAID

## 2021-04-30 DIAGNOSIS — Z23 ENCOUNTER FOR IMMUNIZATION: Primary | ICD-10-CM

## 2021-04-30 PROCEDURE — 0001A COVID-19, MRNA, LNP-S, PF, 30MCG/0.3ML DOSE(PFIZER): CPT | Performed by: FAMILY MEDICINE

## 2021-04-30 PROCEDURE — 91300 COVID-19, MRNA, LNP-S, PF, 30MCG/0.3ML DOSE(PFIZER): CPT | Performed by: FAMILY MEDICINE

## 2021-05-21 ENCOUNTER — IMMUNIZATION (OUTPATIENT)
Dept: INTERNAL MEDICINE CLINIC | Age: 36
End: 2021-05-21
Payer: MEDICAID

## 2021-05-21 DIAGNOSIS — Z23 ENCOUNTER FOR IMMUNIZATION: Primary | ICD-10-CM

## 2021-05-21 PROCEDURE — 91300 COVID-19, MRNA, LNP-S, PF, 30MCG/0.3ML DOSE(PFIZER): CPT | Performed by: FAMILY MEDICINE

## 2021-05-21 PROCEDURE — 0002A COVID-19, MRNA, LNP-S, PF, 30MCG/0.3ML DOSE(PFIZER): CPT | Performed by: FAMILY MEDICINE

## 2023-05-11 NOTE — PROGRESS NOTES
Called patient to r/s 5/23 appt - left message RM 18  Pt states he has some discomfort in his throat\" as if something is stuck\" this has been going on  For 1 week. Per pt discomfort gets worse when eating . Chief Complaint   Patient presents with    GERD     pt states he has discomfort in his throat , x 1 week       1. Have you been to the ER, urgent care clinic since your last visit? Hospitalized since your last visit? No    2. Have you seen or consulted any other health care providers outside of the [address] [place] [address] since your last visit? Include any pap smears or colon screening.  No    Health Maintenance Due   Topic Date Due    Influenza Age 5 to Adult  08/01/2019     Pt would like to get flu vaccine today, consent signed

## (undated) DEVICE — TUBING HYDR IRR --

## (undated) DEVICE — FORCEPS BX L240CM JAW DIA2.8MM L CAP W/ NDL MIC MESH TOOTH